# Patient Record
Sex: FEMALE | Race: BLACK OR AFRICAN AMERICAN | Employment: OTHER | URBAN - METROPOLITAN AREA
[De-identification: names, ages, dates, MRNs, and addresses within clinical notes are randomized per-mention and may not be internally consistent; named-entity substitution may affect disease eponyms.]

---

## 2017-08-30 ENCOUNTER — OFFICE VISIT (OUTPATIENT)
Dept: INTERNAL MEDICINE CLINIC | Age: 52
End: 2017-08-30

## 2017-08-30 VITALS
BODY MASS INDEX: 23.87 KG/M2 | SYSTOLIC BLOOD PRESSURE: 124 MMHG | DIASTOLIC BLOOD PRESSURE: 76 MMHG | HEART RATE: 60 BPM | WEIGHT: 138 LBS

## 2017-08-30 DIAGNOSIS — D50.9 MICROCYTIC ANEMIA: Chronic | ICD-10-CM

## 2017-08-30 DIAGNOSIS — Z12.11 COLON CANCER SCREENING: Primary | ICD-10-CM

## 2017-08-30 DIAGNOSIS — M47.817 LUMBAR AND SACRAL OSTEOARTHRITIS: ICD-10-CM

## 2017-08-30 DIAGNOSIS — M54.32 SCIATICA OF LEFT SIDE: ICD-10-CM

## 2017-08-30 PROCEDURE — 99213 OFFICE O/P EST LOW 20 MIN: CPT | Performed by: INTERNAL MEDICINE

## 2017-10-19 DIAGNOSIS — Z12.11 SCREEN FOR COLON CANCER: Primary | ICD-10-CM

## 2017-10-19 NOTE — LETTER
[facility] -[address] [place] [address] [phone]    Soto Johnson MD                        ENDOSCOPY ORDER   -- Time of order -- 10/19/17    11:07 AM    Facility:   [facility]. # _________________                              Scheduled by: ____________    Colonoscopy Date & Time: 17  10:00 AM            Pt arrival: 8:30 AM                                                                                      Patient Name:  Rowdy Grier     :  1965 PCP:  Lalo Galeano MD      Home Ph:    [phone] (home) [phone] (work)    SSN:                                                PROCEDURE:  Colonoscopy, possible polypectomy         [id number]    DIAGNOSIS:      ICD-10-CM ICD-9-CM    1. Screen for colon cancer Z12.11 V76.51 polyethylene glycol (GOLYTELY) 236 g solution       Anesthesia: _MAC_  Time Needed:  45 minutes   Pt Position:  lateral, right side up         Outpatient _X_         Pre-Op H & P to be done by:                            __x___PREPLyinez Jarvises (ordered 10/19/17)                           _____Cardiac Clearance by; ___________     Medications to be stopped 5 days before surgery: _________  Additional / Special Orders:                               COLONOSCOPY: Pre-procedure Information, Instructions, Prep, and Directions    PLEASE READ THIS ENTIRE HANDOUT. CALL THE OFFICE WITH ANY QUESTIONS: [phone] [facility]) or [phone] Jese Maxwell)     What is a Colonoscopy? A colonoscopy is a test (also called a procedure) that lets the doctor look inside your large intestine (colon). The doctor uses a thin, lighted tube called a colonoscope. There are many reasons to perform a colonoscopy, such as bleeding, abdominal pain, anemia, diarrhea, severe constipation, or for cancer screening and prevention. do not stop until you get the ok      The Day Before your Colonoscopy  · DO NOT EAT SOLID FOOD THE DAY BEFORE YOUR PROCEDURE.   · DRINK ONLY CLEAR LIQUIDS THE DAY BEFORE YOUR PROCEDURE:  · You can have water, tea, coffee (without cream), clear juices without pulp, soda, clear broths, hard candy, flavored ice pops, Ensure/Boost drinks, and gelatin (such as Jell-O). Do not drink anything red or purple, such as grape juice or fruit punch. · Drink at least 8oz of clear liquids every hour throughout the day to keep you hydrated. · In the morning- mix the Golytely solution (this was sent to your pharmacy) and place in the refrigerator. · At 4pm-Drink an 8oz glass of GoLytely every 10-20 min until ½ of the bottle is empty. If you become nauseated, take a rest and then be sure to finish half of the bottle. ? If you are diabetic please talk to your Doctor about your Insulin instructions. The Day of your Colonoscopy  · 6 hours before your procedure- Drink an 8oz glass of GoLytely every 10-20 min until the bottle is completely gone. · Stop drinking clear liquids 3 hours before the test.  · The morning of the procedure- You may take any heart or blood pressure medications with a small sip of water. · You must have someone WITH YOU to drive you home after the procedure. · PLEASE ARRIVE AT [place] [address] 1.5 HOURS BEFORE THE START OF THE PROCEDURE  · Please bring a list of medications, any inhalers, photo ID and insurance cards with you. If you have a pacemaker or defibrillator, please inform your nurse. · Please call [phone] with questions    For many people, the prep is worse than the procedure itself. It may be uncomfortable, and you may feel hungry on the clear liquid diet. Some people do not go to work or do not do their usual activities on the day of the prep. Arrange to have someone take you home after the procedure. What can you expect after a colonoscopy? recommended. Be sure to communicate with your primary care physician about the results of your colonoscopy.     YOU ARE SCHEDULED FOR COLONOSCOPY WITH DR Shanda Saint    Date: 11/13/17    Time:  10:00 AM    Please arrive at [facility] at: 8:30 AM

## 2017-11-02 RX ORDER — SODIUM CHLORIDE 9 MG/ML
INJECTION, SOLUTION INTRAVENOUS CONTINUOUS
Status: CANCELLED | OUTPATIENT
Start: 2017-11-02

## 2017-11-02 RX ORDER — LIDOCAINE HYDROCHLORIDE 10 MG/ML
1 INJECTION, SOLUTION EPIDURAL; INFILTRATION; INTRACAUDAL; PERINEURAL
Status: CANCELLED | OUTPATIENT
Start: 2017-11-02 | End: 2017-11-02

## 2017-11-10 ENCOUNTER — TELEPHONE (OUTPATIENT)
Dept: SURGERY | Age: 52
End: 2017-11-10

## 2017-11-13 ENCOUNTER — HOSPITAL ENCOUNTER (OUTPATIENT)
Dept: ENDOSCOPY | Age: 52
Discharge: HOME | End: 2017-11-07
Attending: SURGERY | Admitting: SURGERY

## 2017-11-30 ENCOUNTER — TELEPHONE (OUTPATIENT)
Dept: SURGERY | Age: 52
End: 2017-11-30

## 2017-12-29 ENCOUNTER — TELEPHONE (OUTPATIENT)
Dept: SURGERY | Age: 52
End: 2017-12-29

## 2018-01-04 ENCOUNTER — TELEPHONE (OUTPATIENT)
Dept: SURGERY | Age: 53
End: 2018-01-04

## 2018-04-09 ENCOUNTER — OFFICE VISIT (OUTPATIENT)
Dept: INTERNAL MEDICINE CLINIC | Age: 53
End: 2018-04-09

## 2018-04-09 ENCOUNTER — HOSPITAL ENCOUNTER (OUTPATIENT)
Dept: OTHER | Age: 53
Discharge: HOME | End: 2018-04-09
Attending: INTERNAL MEDICINE | Admitting: INTERNAL MEDICINE

## 2018-04-09 VITALS
SYSTOLIC BLOOD PRESSURE: 132 MMHG | HEART RATE: 60 BPM | BODY MASS INDEX: 24.1 KG/M2 | HEIGHT: 63 IN | DIASTOLIC BLOOD PRESSURE: 84 MMHG | WEIGHT: 136 LBS

## 2018-04-09 DIAGNOSIS — M75.52 DELTOID BURSITIS, LEFT: Primary | ICD-10-CM

## 2018-04-09 DIAGNOSIS — M75.81 TENDINITIS OF RIGHT ROTATOR CUFF: Primary | ICD-10-CM

## 2018-04-09 DIAGNOSIS — M75.32 CALCIFIC TENDINITIS OF LEFT SHOULDER REGION: ICD-10-CM

## 2018-04-09 DIAGNOSIS — M75.52 DELTOID BURSITIS, LEFT: ICD-10-CM

## 2018-04-09 PROCEDURE — 99213 OFFICE O/P EST LOW 20 MIN: CPT | Performed by: INTERNAL MEDICINE

## 2018-04-09 PROCEDURE — 96372 THER/PROPH/DIAG INJ SC/IM: CPT | Performed by: INTERNAL MEDICINE

## 2018-04-09 RX ORDER — KETOROLAC TROMETHAMINE 30 MG/ML
60 INJECTION, SOLUTION INTRAMUSCULAR; INTRAVENOUS
Qty: 2 ML | Refills: 0
Start: 2018-04-09 | End: 2018-10-24 | Stop reason: ALTCHOICE

## 2018-04-09 RX ORDER — KETOROLAC TROMETHAMINE 30 MG/ML
60 INJECTION, SOLUTION INTRAMUSCULAR; INTRAVENOUS ONCE
Status: COMPLETED | OUTPATIENT
Start: 2018-04-09 | End: 2018-04-09

## 2018-04-09 RX ORDER — TRAMADOL HYDROCHLORIDE 50 MG/1
50 TABLET ORAL EVERY 6 HOURS PRN
Qty: 28 TABLET | Refills: 0 | Status: SHIPPED | OUTPATIENT
Start: 2018-04-09 | End: 2018-04-16

## 2018-04-09 RX ORDER — DICLOFENAC POTASSIUM 50 MG/1
50 TABLET, FILM COATED ORAL 2 TIMES DAILY
Qty: 60 TABLET | Refills: 3 | Status: SHIPPED | OUTPATIENT
Start: 2018-04-09 | End: 2018-06-19 | Stop reason: SDUPTHER

## 2018-04-09 RX ADMIN — KETOROLAC TROMETHAMINE 60 MG: 30 INJECTION, SOLUTION INTRAMUSCULAR; INTRAVENOUS at 10:06

## 2018-04-11 ENCOUNTER — TELEPHONE (OUTPATIENT)
Dept: RHEUMATOLOGY | Age: 53
End: 2018-04-11

## 2018-04-16 ENCOUNTER — TELEPHONE (OUTPATIENT)
Dept: INTERNAL MEDICINE CLINIC | Age: 53
End: 2018-04-16

## 2018-04-16 DIAGNOSIS — M75.82 TENDINITIS OF LEFT ROTATOR CUFF: Primary | ICD-10-CM

## 2018-04-24 ENCOUNTER — HOSPITAL ENCOUNTER (OUTPATIENT)
Dept: PHYSICAL THERAPY | Age: 53
Discharge: HOME | End: 2018-04-30
Admitting: INTERNAL MEDICINE

## 2018-05-01 ENCOUNTER — HOSPITAL ENCOUNTER (OUTPATIENT)
Dept: OTHER | Age: 53
Discharge: HOME | End: 2018-05-31
Attending: INTERNAL MEDICINE | Admitting: INTERNAL MEDICINE

## 2018-05-08 ENCOUNTER — OFFICE VISIT (OUTPATIENT)
Dept: INTERNAL MEDICINE CLINIC | Age: 53
End: 2018-05-08

## 2018-05-08 VITALS
HEART RATE: 72 BPM | SYSTOLIC BLOOD PRESSURE: 122 MMHG | BODY MASS INDEX: 24.45 KG/M2 | DIASTOLIC BLOOD PRESSURE: 74 MMHG | HEIGHT: 63 IN | WEIGHT: 138 LBS

## 2018-05-08 DIAGNOSIS — M75.102 ROTATOR CUFF SYNDROME OF LEFT SHOULDER: Primary | ICD-10-CM

## 2018-05-08 PROCEDURE — 99213 OFFICE O/P EST LOW 20 MIN: CPT | Performed by: INTERNAL MEDICINE

## 2018-05-08 RX ORDER — TRAMADOL HYDROCHLORIDE 50 MG/1
50 TABLET ORAL EVERY 4 HOURS PRN
Qty: 42 TABLET | Refills: 0 | Status: SHIPPED | OUTPATIENT
Start: 2018-05-08 | End: 2018-05-15

## 2018-05-08 ASSESSMENT — PATIENT HEALTH QUESTIONNAIRE - PHQ9
2. FEELING DOWN, DEPRESSED OR HOPELESS: 0
SUM OF ALL RESPONSES TO PHQ9 QUESTIONS 1 & 2: 0
SUM OF ALL RESPONSES TO PHQ QUESTIONS 1-9: 0
1. LITTLE INTEREST OR PLEASURE IN DOING THINGS: 0

## 2018-05-24 ENCOUNTER — TELEPHONE (OUTPATIENT)
Dept: INTERNAL MEDICINE CLINIC | Age: 53
End: 2018-05-24

## 2018-05-29 ENCOUNTER — TELEPHONE (OUTPATIENT)
Dept: INTERNAL MEDICINE CLINIC | Age: 53
End: 2018-05-29

## 2018-05-30 ENCOUNTER — TELEPHONE (OUTPATIENT)
Dept: INTERNAL MEDICINE CLINIC | Age: 53
End: 2018-05-30

## 2018-06-01 ENCOUNTER — HOSPITAL ENCOUNTER (OUTPATIENT)
Dept: OTHER | Age: 53
Discharge: HOME | End: 2018-06-30
Attending: INTERNAL MEDICINE | Admitting: INTERNAL MEDICINE

## 2018-06-19 ENCOUNTER — OFFICE VISIT (OUTPATIENT)
Dept: INTERNAL MEDICINE CLINIC | Age: 53
End: 2018-06-19

## 2018-06-19 VITALS
WEIGHT: 136 LBS | DIASTOLIC BLOOD PRESSURE: 70 MMHG | BODY MASS INDEX: 24.1 KG/M2 | HEART RATE: 64 BPM | SYSTOLIC BLOOD PRESSURE: 110 MMHG | HEIGHT: 63 IN

## 2018-06-19 DIAGNOSIS — M75.32 CALCIFIC TENDINITIS OF LEFT SHOULDER REGION: ICD-10-CM

## 2018-06-19 DIAGNOSIS — M75.82 TENDINITIS OF LEFT ROTATOR CUFF: Primary | ICD-10-CM

## 2018-06-19 DIAGNOSIS — M75.52 DELTOID BURSITIS, LEFT: ICD-10-CM

## 2018-06-19 PROCEDURE — 99213 OFFICE O/P EST LOW 20 MIN: CPT | Performed by: INTERNAL MEDICINE

## 2018-06-19 RX ORDER — DICLOFENAC POTASSIUM 50 MG/1
50 TABLET, FILM COATED ORAL 2 TIMES DAILY
Qty: 60 TABLET | Refills: 5 | Status: SHIPPED | OUTPATIENT
Start: 2018-06-19 | End: 2021-08-04 | Stop reason: SDUPTHER

## 2018-07-01 ENCOUNTER — HOSPITAL ENCOUNTER (OUTPATIENT)
Dept: OTHER | Age: 53
Discharge: HOME | End: 2018-07-31
Attending: INTERNAL MEDICINE | Admitting: INTERNAL MEDICINE

## 2018-09-17 ENCOUNTER — TELEPHONE (OUTPATIENT)
Dept: INTERNAL MEDICINE CLINIC | Age: 53
End: 2018-09-17

## 2018-10-24 ENCOUNTER — OFFICE VISIT (OUTPATIENT)
Dept: INTERNAL MEDICINE CLINIC | Age: 53
End: 2018-10-24
Payer: COMMERCIAL

## 2018-10-24 VITALS
HEART RATE: 64 BPM | WEIGHT: 134 LBS | DIASTOLIC BLOOD PRESSURE: 72 MMHG | BODY MASS INDEX: 23.74 KG/M2 | SYSTOLIC BLOOD PRESSURE: 110 MMHG

## 2018-10-24 DIAGNOSIS — Z12.11 COLON CANCER SCREENING: Primary | ICD-10-CM

## 2018-10-24 DIAGNOSIS — Z11.4 SCREENING FOR HIV (HUMAN IMMUNODEFICIENCY VIRUS): ICD-10-CM

## 2018-10-24 DIAGNOSIS — M75.102 ROTATOR CUFF SYNDROME OF LEFT SHOULDER: Chronic | ICD-10-CM

## 2018-10-24 DIAGNOSIS — Z00.00 ROUTINE ADULT HEALTH MAINTENANCE: ICD-10-CM

## 2018-10-24 PROCEDURE — 99213 OFFICE O/P EST LOW 20 MIN: CPT | Performed by: INTERNAL MEDICINE

## 2018-10-25 DIAGNOSIS — Z11.4 SCREENING FOR HIV (HUMAN IMMUNODEFICIENCY VIRUS): ICD-10-CM

## 2018-10-25 DIAGNOSIS — Z00.00 ROUTINE ADULT HEALTH MAINTENANCE: ICD-10-CM

## 2018-10-25 LAB
A/G RATIO: 1.7 (ref 1.1–2.2)
ALBUMIN SERPL-MCNC: 4.6 G/DL (ref 3.4–5)
ALP BLD-CCNC: 52 U/L (ref 40–129)
ALT SERPL-CCNC: 7 U/L (ref 10–40)
ANION GAP SERPL CALCULATED.3IONS-SCNC: 13 MMOL/L (ref 3–16)
AST SERPL-CCNC: 12 U/L (ref 15–37)
BASOPHILS ABSOLUTE: 0 K/UL (ref 0–0.2)
BASOPHILS RELATIVE PERCENT: 0.5 %
BILIRUB SERPL-MCNC: 0.3 MG/DL (ref 0–1)
BUN BLDV-MCNC: 12 MG/DL (ref 7–20)
CALCIUM SERPL-MCNC: 10 MG/DL (ref 8.3–10.6)
CHLORIDE BLD-SCNC: 101 MMOL/L (ref 99–110)
CHOLESTEROL, TOTAL: 240 MG/DL (ref 0–199)
CO2: 26 MMOL/L (ref 21–32)
CREAT SERPL-MCNC: 0.7 MG/DL (ref 0.6–1.1)
EOSINOPHILS ABSOLUTE: 0.3 K/UL (ref 0–0.6)
EOSINOPHILS RELATIVE PERCENT: 3.8 %
GFR AFRICAN AMERICAN: >60
GFR NON-AFRICAN AMERICAN: >60
GLOBULIN: 2.7 G/DL
GLUCOSE BLD-MCNC: 86 MG/DL (ref 70–99)
HCT VFR BLD CALC: 39.4 % (ref 36–48)
HDLC SERPL-MCNC: 66 MG/DL (ref 40–60)
HEMOGLOBIN: 13 G/DL (ref 12–16)
LDL CHOLESTEROL CALCULATED: 163 MG/DL
LYMPHOCYTES ABSOLUTE: 2.1 K/UL (ref 1–5.1)
LYMPHOCYTES RELATIVE PERCENT: 25.1 %
MCH RBC QN AUTO: 23.7 PG (ref 26–34)
MCHC RBC AUTO-ENTMCNC: 33.1 G/DL (ref 31–36)
MCV RBC AUTO: 71.6 FL (ref 80–100)
MONOCYTES ABSOLUTE: 0.5 K/UL (ref 0–1.3)
MONOCYTES RELATIVE PERCENT: 5.9 %
NEUTROPHILS ABSOLUTE: 5.4 K/UL (ref 1.7–7.7)
NEUTROPHILS RELATIVE PERCENT: 64.7 %
PDW BLD-RTO: 14.7 % (ref 12.4–15.4)
PLATELET # BLD: 237 K/UL (ref 135–450)
PMV BLD AUTO: 9.9 FL (ref 5–10.5)
POTASSIUM SERPL-SCNC: 4.4 MMOL/L (ref 3.5–5.1)
RBC # BLD: 5.5 M/UL (ref 4–5.2)
SODIUM BLD-SCNC: 140 MMOL/L (ref 136–145)
T4 FREE: 1.1 NG/DL (ref 0.9–1.8)
TOTAL PROTEIN: 7.3 G/DL (ref 6.4–8.2)
TRIGL SERPL-MCNC: 55 MG/DL (ref 0–150)
TSH SERPL DL<=0.05 MIU/L-ACNC: 1.19 UIU/ML (ref 0.27–4.2)
VLDLC SERPL CALC-MCNC: 11 MG/DL
WBC # BLD: 8.3 K/UL (ref 4–11)

## 2018-10-26 LAB
HIV AG/AB: NORMAL
HIV ANTIGEN: NORMAL
HIV-1 ANTIBODY: NORMAL
HIV-2 AB: NORMAL

## 2019-04-11 ENCOUNTER — TELEPHONE (OUTPATIENT)
Dept: INTERNAL MEDICINE CLINIC | Age: 54
End: 2019-04-11

## 2019-04-11 DIAGNOSIS — Z82.49 FAMILY HISTORY OF THROMBOSIS: Primary | ICD-10-CM

## 2019-04-16 ENCOUNTER — HOSPITAL ENCOUNTER (OUTPATIENT)
Age: 54
Discharge: HOME | End: 2019-04-16
Payer: COMMERCIAL

## 2019-04-16 DIAGNOSIS — Z82.49 FAMILY HISTORY OF THROMBOSIS: ICD-10-CM

## 2019-04-16 PROCEDURE — 85300 ANTITHROMBIN III ACTIVITY: CPT

## 2019-04-16 PROCEDURE — 85301 ANTITHROMBIN III ANTIGEN: CPT

## 2019-04-16 PROCEDURE — 81241 F5 GENE: CPT

## 2019-04-19 LAB
ANTITHROMBIN ACTIVITY: 99 % (ref 76–128)
ANTITHROMBIN ANTIGEN: 101 % (ref 82–136)

## 2019-04-20 LAB
FACTOR V LEIDEN: ABNORMAL
SPECIMEN: ABNORMAL

## 2019-04-26 ENCOUNTER — OFFICE VISIT (OUTPATIENT)
Dept: INTERNAL MEDICINE CLINIC | Age: 54
End: 2019-04-26
Payer: COMMERCIAL

## 2019-04-26 VITALS
BODY MASS INDEX: 24.45 KG/M2 | WEIGHT: 138 LBS | HEART RATE: 60 BPM | HEIGHT: 63 IN | SYSTOLIC BLOOD PRESSURE: 120 MMHG | DIASTOLIC BLOOD PRESSURE: 72 MMHG

## 2019-04-26 DIAGNOSIS — Z83.2 FAMILY HISTORY OF HYPERCOAGULABLE STATE: ICD-10-CM

## 2019-04-26 DIAGNOSIS — M75.102 ROTATOR CUFF SYNDROME OF LEFT SHOULDER: Primary | Chronic | ICD-10-CM

## 2019-04-26 PROCEDURE — 99213 OFFICE O/P EST LOW 20 MIN: CPT | Performed by: INTERNAL MEDICINE

## 2019-04-26 ASSESSMENT — PATIENT HEALTH QUESTIONNAIRE - PHQ9
1. LITTLE INTEREST OR PLEASURE IN DOING THINGS: 0
SUM OF ALL RESPONSES TO PHQ QUESTIONS 1-9: 0
2. FEELING DOWN, DEPRESSED OR HOPELESS: 0
SUM OF ALL RESPONSES TO PHQ QUESTIONS 1-9: 0
SUM OF ALL RESPONSES TO PHQ9 QUESTIONS 1 & 2: 0

## 2019-04-26 NOTE — PROGRESS NOTES
SUBJECTIVE:  Patient ID: Geni Luna is a 48 y.o. y.o. female     HPI    Geni Luna returns to the office for follow up of osteoarthritis. Pain is located in the left shoulder. She has been taking medication for treatment of symptoms for month(s). Medications for treatment of pain include NSAID's and surgery. Patient reports good relief ofsymptoms. Pain is not associated with other symptoms. Factor 5 Leiden and anti-thrombin III tests were done to determine whether or not the patient had a hypercoagulable state. The 10-year ASCVD risk score (Susan Gallegos, et al., 2013) is: 1.6%    Values used to calculate the score:      Age: 48 years      Sex: Female      Is Non- : Yes      Diabetic: No      Tobacco smoker: No      Systolic Blood Pressure: 571 mmHg      Is BP treated: No      HDL Cholesterol: 66 mg/dL      Total Cholesterol: 240 mg/dL    Review of Systems    OBJECTIVE:    /72   Pulse 60   Ht 5' 3\" (1.6 m)   Wt 138 lb (62.6 kg)   BMI 24.45 kg/m²    Physical Exam   Constitutional: She is oriented to person, place, and time. She appears well-developed and well-nourished. No distress. HENT:   Head: Normocephalic and atraumatic. Pulmonary/Chest: Effort normal.   Neurological: She is alert and oriented to person, place, and time. No cranial nerve deficit. Skin: She is not diaphoretic. Psychiatric: She has a normal mood and affect. Her behavior is normal. Judgment and thought content normal.   Vitals reviewed. Current Outpatient Medications:     Multiple Vitamins-Minerals (THERAPEUTIC MULTIVITAMIN-MINERALS) tablet, Take 1 tablet by mouth daily, Disp: , Rfl:     Omega-3 Fatty Acids (FISH OIL PO), Take by mouth, Disp: , Rfl:     fluticasone (FLONASE) 50 MCG/ACT nasal spray, 1 spray by Nasal route daily. , Disp: 3 Bottle, Rfl: 3    ibuprofen (IBU) 600 MG tablet, Take 1 tablet by mouth every 6 hours as needed for Pain., Disp: 20 tablet, Rfl: 0  

## 2019-08-06 ENCOUNTER — TELEPHONE (OUTPATIENT)
Dept: INTERNAL MEDICINE CLINIC | Age: 54
End: 2019-08-06

## 2019-08-06 DIAGNOSIS — D22.9 CHANGE IN MOLE: Primary | ICD-10-CM

## 2019-08-07 NOTE — TELEPHONE ENCOUNTER
Patient called back and stated that Dr. Manjula Bah is not accepting new patients. Any other recommendations?

## 2019-10-16 ENCOUNTER — OFFICE VISIT (OUTPATIENT)
Dept: DERMATOLOGY | Age: 54
End: 2019-10-16
Payer: COMMERCIAL

## 2019-10-16 DIAGNOSIS — L82.1 SEBORRHEIC KERATOSIS: Primary | ICD-10-CM

## 2019-10-16 PROCEDURE — 99202 OFFICE O/P NEW SF 15 MIN: CPT | Performed by: DERMATOLOGY

## 2021-05-17 DIAGNOSIS — U07.1 COVID-19: Primary | ICD-10-CM

## 2021-05-18 ENCOUNTER — TELEPHONE (OUTPATIENT)
Dept: INTERNAL MEDICINE CLINIC | Age: 56
End: 2021-05-18

## 2021-05-18 NOTE — TELEPHONE ENCOUNTER
Huong Avelinokirby called in to advise that she has been diagnosed with Covid and would like inquire about getting a prescription called in to help with nausea. She would prefer the [facility] pharmacy located at [address] [place]  [facility]. Please reach out to her @ [phone]. Juanito Dean(Attending)

## 2021-05-19 ENCOUNTER — TELEPHONE (OUTPATIENT)
Dept: INTERNAL MEDICINE CLINIC | Age: 56
End: 2021-05-19

## 2021-05-19 NOTE — TELEPHONE ENCOUNTER
Joe Hawkins from [facility] called to verify Elena's  and PCP. It's in regards to 20 tablets of Zofran 4mg. The /PCP was missing. Please contact Joe Hawkins @ [phone] to verify that the prescription is correct. Thank you!

## 2021-05-21 DIAGNOSIS — R11.0 NAUSEA: Primary | ICD-10-CM

## 2021-05-21 RX ORDER — ONDANSETRON 4 MG/1
4 TABLET, ORALLY DISINTEGRATING ORAL 3 TIMES DAILY PRN
Qty: 21 TABLET | Refills: 0 | Status: SHIPPED | OUTPATIENT
Start: 2021-05-21

## 2021-08-03 ENCOUNTER — TELEPHONE (OUTPATIENT)
Dept: INTERNAL MEDICINE CLINIC | Age: 56
End: 2021-08-03

## 2021-08-03 DIAGNOSIS — U07.1 COVID-19: Primary | ICD-10-CM

## 2021-08-03 NOTE — TELEPHONE ENCOUNTER
I called pt back and she states she is debating on getting the vaccine now. She wants to know where she is as far as antibodies go since she has had covid in the past.  Please advise. Thanks.

## 2021-08-03 NOTE — TELEPHONE ENCOUNTER
----- Message from [facility] sent at 8/2/2021  3:49 PM EDT -----  Subject: Message to Provider    QUESTIONS  Information for Provider? Pt would like to know does the office do   tighters for COVID. pt would like to be tested if so.   ---------------------------------------------------------------------------  --------------  CALL BACK INFO  What is the best way for the office to contact you? OK to leave message on   voicemail  Preferred Call Back Phone Number? [phone]  ---------------------------------------------------------------------------  --------------  SCRIPT ANSWERS  Relationship to Patient?  Self

## 2021-08-04 ENCOUNTER — OFFICE VISIT (OUTPATIENT)
Dept: INTERNAL MEDICINE CLINIC | Age: 56
End: 2021-08-04
Payer: COMMERCIAL

## 2021-08-04 VITALS
TEMPERATURE: 97.1 F | RESPIRATION RATE: 16 BRPM | OXYGEN SATURATION: 98 % | DIASTOLIC BLOOD PRESSURE: 70 MMHG | SYSTOLIC BLOOD PRESSURE: 114 MMHG | HEART RATE: 64 BPM | WEIGHT: 128.6 LBS | BODY MASS INDEX: 22.78 KG/M2

## 2021-08-04 DIAGNOSIS — Z00.00 ROUTINE ADULT HEALTH MAINTENANCE: Primary | ICD-10-CM

## 2021-08-04 DIAGNOSIS — Z00.00 ROUTINE ADULT HEALTH MAINTENANCE: ICD-10-CM

## 2021-08-04 DIAGNOSIS — M75.52 DELTOID BURSITIS, LEFT: ICD-10-CM

## 2021-08-04 DIAGNOSIS — Z11.59 NEED FOR HEPATITIS C SCREENING TEST: ICD-10-CM

## 2021-08-04 DIAGNOSIS — M75.82 TENDINITIS OF LEFT ROTATOR CUFF: ICD-10-CM

## 2021-08-04 DIAGNOSIS — M75.32 CALCIFIC TENDINITIS OF LEFT SHOULDER REGION: ICD-10-CM

## 2021-08-04 DIAGNOSIS — Z12.11 COLON CANCER SCREENING: ICD-10-CM

## 2021-08-04 DIAGNOSIS — M17.11 PRIMARY OSTEOARTHRITIS OF RIGHT KNEE: ICD-10-CM

## 2021-08-04 LAB
A/G RATIO: 1.4 (ref 1.1–2.2)
ALBUMIN SERPL-MCNC: 4.3 G/DL (ref 3.4–5)
ALP BLD-CCNC: 58 U/L (ref 40–129)
ALT SERPL-CCNC: <5 U/L (ref 10–40)
ANION GAP SERPL CALCULATED.3IONS-SCNC: 14 MMOL/L (ref 3–16)
AST SERPL-CCNC: 17 U/L (ref 15–37)
BASOPHILS ABSOLUTE: 0.1 K/UL (ref 0–0.2)
BASOPHILS RELATIVE PERCENT: 0.9 %
BILIRUB SERPL-MCNC: 0.4 MG/DL (ref 0–1)
BUN BLDV-MCNC: 12 MG/DL (ref 7–20)
CALCIUM SERPL-MCNC: 9.4 MG/DL (ref 8.3–10.6)
CHLORIDE BLD-SCNC: 99 MMOL/L (ref 99–110)
CHOLESTEROL, TOTAL: 272 MG/DL (ref 0–199)
CO2: 24 MMOL/L (ref 21–32)
CREAT SERPL-MCNC: 0.7 MG/DL (ref 0.6–1.1)
EOSINOPHILS ABSOLUTE: 0.4 K/UL (ref 0–0.6)
EOSINOPHILS RELATIVE PERCENT: 4.9 %
GFR AFRICAN AMERICAN: >60
GFR NON-AFRICAN AMERICAN: >60
GLOBULIN: 3 G/DL
GLUCOSE BLD-MCNC: 81 MG/DL (ref 70–99)
HCT VFR BLD CALC: 36 % (ref 36–48)
HDLC SERPL-MCNC: 70 MG/DL (ref 40–60)
HEMOGLOBIN: 12.3 G/DL (ref 12–16)
HEPATITIS C ANTIBODY INTERPRETATION: NORMAL
LDL CHOLESTEROL CALCULATED: 194 MG/DL
LYMPHOCYTES ABSOLUTE: 2 K/UL (ref 1–5.1)
LYMPHOCYTES RELATIVE PERCENT: 27.4 %
MCH RBC QN AUTO: 24.3 PG (ref 26–34)
MCHC RBC AUTO-ENTMCNC: 34.3 G/DL (ref 31–36)
MCV RBC AUTO: 70.9 FL (ref 80–100)
MONOCYTES ABSOLUTE: 0.5 K/UL (ref 0–1.3)
MONOCYTES RELATIVE PERCENT: 7.3 %
NEUTROPHILS ABSOLUTE: 4.4 K/UL (ref 1.7–7.7)
NEUTROPHILS RELATIVE PERCENT: 59.5 %
PDW BLD-RTO: 14.9 % (ref 12.4–15.4)
PLATELET # BLD: 256 K/UL (ref 135–450)
PMV BLD AUTO: 9.4 FL (ref 5–10.5)
POTASSIUM SERPL-SCNC: 4.3 MMOL/L (ref 3.5–5.1)
RBC # BLD: 5.07 M/UL (ref 4–5.2)
SODIUM BLD-SCNC: 137 MMOL/L (ref 136–145)
T4 FREE: 1.1 NG/DL (ref 0.9–1.8)
TOTAL PROTEIN: 7.3 G/DL (ref 6.4–8.2)
TRIGL SERPL-MCNC: 39 MG/DL (ref 0–150)
TSH SERPL DL<=0.05 MIU/L-ACNC: 1.12 UIU/ML (ref 0.27–4.2)
VLDLC SERPL CALC-MCNC: 8 MG/DL
WBC # BLD: 7.4 K/UL (ref 4–11)

## 2021-08-04 PROCEDURE — 99396 PREV VISIT EST AGE 40-64: CPT | Performed by: INTERNAL MEDICINE

## 2021-08-04 RX ORDER — DICLOFENAC POTASSIUM 50 MG/1
50 TABLET, FILM COATED ORAL 2 TIMES DAILY
Qty: 60 TABLET | Refills: 5 | Status: SHIPPED | OUTPATIENT
Start: 2021-08-04

## 2021-08-04 SDOH — ECONOMIC STABILITY: FOOD INSECURITY: WITHIN THE PAST 12 MONTHS, YOU WORRIED THAT YOUR FOOD WOULD RUN OUT BEFORE YOU GOT MONEY TO BUY MORE.: NEVER TRUE

## 2021-08-04 SDOH — ECONOMIC STABILITY: FOOD INSECURITY: WITHIN THE PAST 12 MONTHS, THE FOOD YOU BOUGHT JUST DIDN'T LAST AND YOU DIDN'T HAVE MONEY TO GET MORE.: NEVER TRUE

## 2021-08-04 ASSESSMENT — PATIENT HEALTH QUESTIONNAIRE - PHQ9
SUM OF ALL RESPONSES TO PHQ9 QUESTIONS 1 & 2: 0
SUM OF ALL RESPONSES TO PHQ QUESTIONS 1-9: 0
2. FEELING DOWN, DEPRESSED OR HOPELESS: 0
1. LITTLE INTEREST OR PLEASURE IN DOING THINGS: 0
SUM OF ALL RESPONSES TO PHQ QUESTIONS 1-9: 0
SUM OF ALL RESPONSES TO PHQ QUESTIONS 1-9: 0

## 2021-08-04 ASSESSMENT — SOCIAL DETERMINANTS OF HEALTH (SDOH): HOW HARD IS IT FOR YOU TO PAY FOR THE VERY BASICS LIKE FOOD, HOUSING, MEDICAL CARE, AND HEATING?: NOT HARD AT ALL

## 2021-08-04 NOTE — PROGRESS NOTES
Narrative    Not on file     Social Determinants of Health     Financial Resource Strain: Low Risk     Difficulty of Paying Living Expenses: Not hard at all   Food Insecurity: No Food Insecurity    Worried About Running Out of Food in the Last Year: Never true    [address] in the Last Year: Never true   Transportation Needs:     Lack of Transportation (Medical):  Lack of Transportation (Non-Medical):    Physical Activity:     Days of Exercise per Week:     Minutes of Exercise per Session:    Stress:     Feeling of Stress :    Social Connections:     Frequency of Communication with Friends and Family:     Frequency of Social Gatherings with Friends and Family:     Attends [employer] Services:     Active Member of Clubs or Organizations:     Attends Club or Organization Meetings:     Marital Status:    Intimate Partner Violence:     Fear of Current or Ex-Partner:     Emotionally Abused:     Physically Abused:     Sexually Abused:        Current Outpatient Medications:     Multiple Vitamins-Minerals (THERAPEUTIC MULTIVITAMIN-MINERALS) tablet, Take 1 tablet by mouth daily, Disp: , Rfl:     fluticasone (FLONASE) 50 MCG/ACT nasal spray, 1 spray by Nasal route daily. , Disp: 3 Bottle, Rfl: 3    ibuprofen (IBU) 600 MG tablet, Take 1 tablet by mouth every 6 hours as needed for Pain., Disp: 20 tablet, Rfl: 0    ondansetron (ZOFRAN-ODT) 4 MG disintegrating tablet, Take 1 tablet by mouth 3 times daily as needed for Nausea or Vomiting, Disp: 21 tablet, Rfl: 0    diclofenac (CATAFLAM) 50 MG tablet, Take 1 tablet by mouth 2 times daily (Patient not taking: Reported on 10/16/2019), Disp: 60 tablet, Rfl: 5    Omega-3 Fatty Acids (FISH OIL PO), Take by mouth, Disp: , Rfl:   Allergies   Allergen Reactions    Sulfa Antibiotics Other (See Comments)     Burning from sulfa cream     Review of Systems   Musculoskeletal: Positive for arthralgias (Bilateral knees, thumb).      Vitals:    08/04/21 1407   BP: 114/70   Pulse: 64   Resp: 16   Temp: 97.1 °F (36.2 °C)   SpO2: 98%     Physical Exam  Vitals reviewed. Constitutional:       General: She is not in acute distress. Appearance: Normal appearance. She is well-developed. She is not diaphoretic. HENT:      Head: Normocephalic and atraumatic. Right Ear: External ear normal.      Left Ear: External ear normal.      Nose: Nose normal. No nasal deformity, congestion or rhinorrhea. Mouth/Throat:      Mouth: Mucous membranes are moist. No lacerations or oral lesions. Dentition: Does not have dentures. Pharynx: Oropharynx is clear. No oropharyngeal exudate or uvula swelling. Eyes:      General: No scleral icterus. Left eye: No discharge. Extraocular Movements: Extraocular movements intact. Conjunctiva/sclera: Conjunctivae normal.      Pupils: Pupils are equal, round, and reactive to light. Neck:      Thyroid: No thyroid mass or thyromegaly. Vascular: Normal carotid pulses. No carotid bruit, hepatojugular reflux or JVD. Trachea: Trachea normal. No tracheal tenderness or tracheal deviation. Cardiovascular:      Rate and Rhythm: Normal rate and regular rhythm. Chest Wall: PMI is not displaced. Pulses: Normal pulses. Heart sounds: Normal heart sounds, S1 normal and S2 normal. Heart sounds not distant. No murmur heard. No systolic murmur is present. No diastolic murmur is present. No friction rub. No gallop. No S3 or S4 sounds. Pulmonary:      Effort: Pulmonary effort is normal. No respiratory distress. Breath sounds: Normal breath sounds. No stridor. No wheezing, rhonchi or rales. Chest:      Chest wall: No tenderness. Abdominal:      General: Bowel sounds are normal. There is no distension or abdominal bruit. Palpations: Abdomen is soft. There is no shifting dullness or mass. Tenderness: There is no abdominal tenderness.  There is no right CVA tenderness, left CVA tenderness, guarding or rebound. Hernia: No hernia is present. Musculoskeletal:         General: Tenderness present. No swelling, deformity or signs of injury. Normal range of motion. Cervical back: Full passive range of motion without pain, normal range of motion and neck supple. No edema or rigidity. Right lower leg: No edema. Left lower leg: No edema. Lymphadenopathy:      Cervical: No cervical adenopathy. Skin:     General: Skin is warm and dry. Coloration: Skin is not pale. Findings: No rash. Neurological:      Mental Status: She is alert and oriented to person, place, and time. Cranial Nerves: No cranial nerve deficit. Psychiatric:         Mood and Affect: Mood is not anxious. Speech: Speech normal.         Behavior: Behavior normal. Behavior is not agitated. Thought Content: Thought content normal.         Judgment: Judgment normal.       Assessment/Plan:  Sajan Felix was seen today for annual exam.    Diagnoses and all orders for this visit:    Routine adult health maintenance  -     CBC Auto Differential; Future  -     Lipid Panel; Future  -     T4, Free; Future  -     TSH without Reflex; Future  -     Comprehensive Metabolic Panel; Future    Colon cancer screening  -     COLONOSCOPY (Screening); Future  -     Twin Kelley MD, Gastroenterology, [facility]    Primary osteoarthritis of right knee    Deltoid bursitis, left  -     diclofenac (CATAFLAM) 50 MG tablet; Take 1 tablet by mouth 2 times daily    Calcific tendinitis of left shoulder region  -     diclofenac (CATAFLAM) 50 MG tablet; Take 1 tablet by mouth 2 times daily    Tendinitis of left rotator cuff  Comments:  Home physical therapy  Orders:  -     diclofenac (CATAFLAM) 50 MG tablet; Take 1 tablet by mouth 2 times daily    Need for hepatitis C screening test  -     HEPATITIS C ANTIBODY;  Future          Dionna Ellis MD  FAC

## 2022-09-09 ENCOUNTER — TELEPHONE (OUTPATIENT)
Dept: INTERNAL MEDICINE CLINIC | Age: 57
End: 2022-09-09

## 2022-09-09 NOTE — TELEPHONE ENCOUNTER
----- Message from Maggie Tsang sent at 9/8/2022  4:26 PM EDT -----  Subject: Appointment Request    Reason for Call: Established Patient Appointment needed: Routine Physical   Exam    QUESTIONS    Reason for appointment request? No appointments available during search     Additional Information for Provider?  Patient is needing an appointment for   a physical. Please call to schedule   ---------------------------------------------------------------------------  --------------  [address] [employer]  [phone]; OK to leave message on voicemail  ---------------------------------------------------------------------------  --------------  SCRIPT ANSWERS  COVID Screen: Cy Garsia

## 2022-12-14 ENCOUNTER — OFFICE VISIT (OUTPATIENT)
Dept: INTERNAL MEDICINE CLINIC | Age: 57
End: 2022-12-14

## 2022-12-14 VITALS
DIASTOLIC BLOOD PRESSURE: 78 MMHG | BODY MASS INDEX: 22.68 KG/M2 | HEART RATE: 67 BPM | OXYGEN SATURATION: 98 % | WEIGHT: 128 LBS | HEIGHT: 63 IN | SYSTOLIC BLOOD PRESSURE: 136 MMHG | TEMPERATURE: 97.1 F

## 2022-12-14 DIAGNOSIS — Z00.00 ROUTINE ADULT HEALTH MAINTENANCE: ICD-10-CM

## 2022-12-14 DIAGNOSIS — Z00.00 ROUTINE ADULT HEALTH MAINTENANCE: Primary | ICD-10-CM

## 2022-12-14 DIAGNOSIS — J30.1 SEASONAL ALLERGIC RHINITIS DUE TO POLLEN: ICD-10-CM

## 2022-12-14 DIAGNOSIS — R00.2 HEART PALPITATIONS: ICD-10-CM

## 2022-12-14 DIAGNOSIS — I25.10 CORONARY ARTERY CALCIFICATION: ICD-10-CM

## 2022-12-14 DIAGNOSIS — I25.84 CORONARY ARTERY CALCIFICATION: ICD-10-CM

## 2022-12-14 LAB
A/G RATIO: 1.7 (ref 1.1–2.2)
ALBUMIN SERPL-MCNC: 4.5 G/DL (ref 3.4–5)
ALP BLD-CCNC: 70 U/L (ref 40–129)
ALT SERPL-CCNC: 7 U/L (ref 10–40)
ANION GAP SERPL CALCULATED.3IONS-SCNC: 13 MMOL/L (ref 3–16)
AST SERPL-CCNC: 13 U/L (ref 15–37)
BASOPHILS ABSOLUTE: 0.1 K/UL (ref 0–0.2)
BASOPHILS RELATIVE PERCENT: 0.8 %
BILIRUB SERPL-MCNC: 0.3 MG/DL (ref 0–1)
BUN BLDV-MCNC: 10 MG/DL (ref 7–20)
CALCIUM SERPL-MCNC: 9.9 MG/DL (ref 8.3–10.6)
CHLORIDE BLD-SCNC: 103 MMOL/L (ref 99–110)
CHOLESTEROL, TOTAL: 301 MG/DL (ref 0–199)
CO2: 26 MMOL/L (ref 21–32)
CREAT SERPL-MCNC: 0.7 MG/DL (ref 0.6–1.1)
EOSINOPHILS ABSOLUTE: 0.2 K/UL (ref 0–0.6)
EOSINOPHILS RELATIVE PERCENT: 3.1 %
GFR SERPL CREATININE-BSD FRML MDRD: >60 ML/MIN/{1.73_M2}
GLUCOSE BLD-MCNC: 82 MG/DL (ref 70–99)
HCT VFR BLD CALC: 39.2 % (ref 36–48)
HDLC SERPL-MCNC: 70 MG/DL (ref 40–60)
HEMOGLOBIN: 12.8 G/DL (ref 12–16)
LDL CHOLESTEROL CALCULATED: 220 MG/DL
LYMPHOCYTES ABSOLUTE: 1.7 K/UL (ref 1–5.1)
LYMPHOCYTES RELATIVE PERCENT: 24 %
MCH RBC QN AUTO: 23.9 PG (ref 26–34)
MCHC RBC AUTO-ENTMCNC: 32.7 G/DL (ref 31–36)
MCV RBC AUTO: 73.1 FL (ref 80–100)
MONOCYTES ABSOLUTE: 0.5 K/UL (ref 0–1.3)
MONOCYTES RELATIVE PERCENT: 6.7 %
NEUTROPHILS ABSOLUTE: 4.7 K/UL (ref 1.7–7.7)
NEUTROPHILS RELATIVE PERCENT: 65.4 %
PDW BLD-RTO: 14.6 % (ref 12.4–15.4)
PLATELET # BLD: 253 K/UL (ref 135–450)
PMV BLD AUTO: 10 FL (ref 5–10.5)
POTASSIUM SERPL-SCNC: 4.5 MMOL/L (ref 3.5–5.1)
RBC # BLD: 5.36 M/UL (ref 4–5.2)
SODIUM BLD-SCNC: 142 MMOL/L (ref 136–145)
T4 FREE: 1.2 NG/DL (ref 0.9–1.8)
TOTAL PROTEIN: 7.2 G/DL (ref 6.4–8.2)
TRIGL SERPL-MCNC: 54 MG/DL (ref 0–150)
TSH SERPL DL<=0.05 MIU/L-ACNC: 1.04 UIU/ML (ref 0.27–4.2)
VLDLC SERPL CALC-MCNC: 11 MG/DL
WBC # BLD: 7.2 K/UL (ref 4–11)

## 2022-12-14 RX ORDER — FLUTICASONE PROPIONATE 50 MCG
1 SPRAY, SUSPENSION (ML) NASAL DAILY
Qty: 3 EACH | Refills: 1 | Status: SHIPPED | OUTPATIENT
Start: 2022-12-14

## 2022-12-14 SDOH — ECONOMIC STABILITY: FOOD INSECURITY: WITHIN THE PAST 12 MONTHS, YOU WORRIED THAT YOUR FOOD WOULD RUN OUT BEFORE YOU GOT MONEY TO BUY MORE.: NEVER TRUE

## 2022-12-14 SDOH — ECONOMIC STABILITY: FOOD INSECURITY: WITHIN THE PAST 12 MONTHS, THE FOOD YOU BOUGHT JUST DIDN'T LAST AND YOU DIDN'T HAVE MONEY TO GET MORE.: NEVER TRUE

## 2022-12-14 ASSESSMENT — PATIENT HEALTH QUESTIONNAIRE - PHQ9
SUM OF ALL RESPONSES TO PHQ QUESTIONS 1-9: 0
2. FEELING DOWN, DEPRESSED OR HOPELESS: 0
SUM OF ALL RESPONSES TO PHQ QUESTIONS 1-9: 0
1. LITTLE INTEREST OR PLEASURE IN DOING THINGS: 0
SUM OF ALL RESPONSES TO PHQ QUESTIONS 1-9: 0
SUM OF ALL RESPONSES TO PHQ QUESTIONS 1-9: 0
SUM OF ALL RESPONSES TO PHQ9 QUESTIONS 1 & 2: 0

## 2022-12-14 ASSESSMENT — SOCIAL DETERMINANTS OF HEALTH (SDOH): HOW HARD IS IT FOR YOU TO PAY FOR THE VERY BASICS LIKE FOOD, HOUSING, MEDICAL CARE, AND HEATING?: NOT HARD AT ALL

## 2022-12-14 NOTE — PROGRESS NOTES
Chief Complaint   Patient presents with    Results     Mammogram results     Assessment/Plan:  Anuel Nicolas was seen today for results. Diagnoses and all orders for this visit:    Routine adult health maintenance  -     CBC with Auto Differential; Future  -     Comprehensive Metabolic Panel; Future  -     Lipid Panel; Future  -     T4, Free; Future  -     TSH; Future    Allergic rhinitis  -     fluticasone (FLONASE) 50 MCG/ACT nasal spray; 1 spray by Nasal route daily    Heart palpitations  -     Stress test, myoview; Future  -     EKG 12 lead  -     Cancel: CARDIAC STRESS TEST EXERCISE ONLY; Future    Coronary artery calcification  -     Stress test, myoview; Future  -     Cancel: CARDIAC STRESS TEST EXERCISE ONLY; Future    The 10-year ASCVD risk score (Rigoberto MENDES, et al., 2019) is: 4.2%    Values used to calculate the score:      Age: 56 years      Sex: Female      Is Non- : Yes      Diabetic: No      Tobacco smoker: No      Systolic Blood Pressure: 226 mmHg      Is BP treated: No      HDL Cholesterol: 70 mg/dL      Total Cholesterol: 272 mg/dL    Vitals:    12/14/22 1005   BP: 136/78   Pulse: 67   Temp: 97.1 °F (36.2 °C)   SpO2: 98%     Physical Exam  Vitals reviewed. Constitutional:       General: She is not in acute distress. Appearance: She is well-developed. She is not diaphoretic. HENT:      Head: Normocephalic and atraumatic. Pulmonary:      Effort: Pulmonary effort is normal.   Neurological:      Mental Status: She is alert and oriented to person, place, and time. Cranial Nerves: No cranial nerve deficit. Psychiatric:         Behavior: Behavior normal.         Thought Content:  Thought content normal.         Judgment: Judgment normal.       PHQ Scores 12/14/2022 8/4/2021 4/26/2019 5/8/2018   PHQ2 Score 0 0 0 0   PHQ9 Score 0 0 0 0     Interpretation of Total Score Depression Severity: 1-4 = Minimal depression, 5-9 = Mild depression, 10-14 = Moderate depression, 15-19 = Moderately severe depression, 20-27 = Severe depression    Kaushal Rivera MD  [address]

## 2022-12-14 NOTE — RESULT ENCOUNTER NOTE
Lynsey Cancino      Your recent lab results are normal.  The 10-year ASCVD risk score (Rigoberto MENDES, et al., 2019) is: 4.6%    Values used to calculate the score:      Age: 56 years      Sex: Female      Is Non- : Yes      Diabetic: No      Tobacco smoker: No      Systolic Blood Pressure: 414 mmHg      Is BP treated: No      HDL Cholesterol: 70 mg/dL      Total Cholesterol: 301 mg/dL      Rosa Charles MD  FACP

## 2022-12-19 RX ORDER — ROSUVASTATIN CALCIUM 10 MG/1
10 TABLET, COATED ORAL DAILY
Qty: 90 TABLET | Refills: 1 | Status: SHIPPED | OUTPATIENT
Start: 2022-12-19 | End: 2022-12-27 | Stop reason: SDUPTHER

## 2022-12-27 ENCOUNTER — HOSPITAL ENCOUNTER (OUTPATIENT)
Dept: NON INVASIVE DIAGNOSTICS | Age: 57
Discharge: HOME | End: 2022-12-27
Payer: MEDICAID

## 2022-12-27 DIAGNOSIS — I25.10 CORONARY ARTERY CALCIFICATION: ICD-10-CM

## 2022-12-27 DIAGNOSIS — R00.2 HEART PALPITATIONS: ICD-10-CM

## 2022-12-27 DIAGNOSIS — E78.2 MIXED HYPERLIPIDEMIA: Primary | ICD-10-CM

## 2022-12-27 DIAGNOSIS — I25.84 CORONARY ARTERY CALCIFICATION: ICD-10-CM

## 2022-12-27 PROCEDURE — 3430000000 HC RX DIAGNOSTIC RADIOPHARMACEUTICAL: Performed by: INTERNAL MEDICINE

## 2022-12-27 PROCEDURE — 78452 HT MUSCLE IMAGE SPECT MULT: CPT | Performed by: INTERNAL MEDICINE

## 2022-12-27 PROCEDURE — A9502 TC99M TETROFOSMIN: HCPCS | Performed by: INTERNAL MEDICINE

## 2022-12-27 PROCEDURE — 93017 CV STRESS TEST TRACING ONLY: CPT | Performed by: INTERNAL MEDICINE

## 2022-12-27 RX ORDER — ROSUVASTATIN CALCIUM 20 MG/1
20 TABLET, COATED ORAL DAILY
Qty: 90 TABLET | Refills: 4 | Status: SHIPPED | OUTPATIENT
Start: 2022-12-27

## 2022-12-27 RX ADMIN — TETROFOSMIN 30 MILLICURIE: 1.38 INJECTION, POWDER, LYOPHILIZED, FOR SOLUTION INTRAVENOUS at 09:40

## 2022-12-27 RX ADMIN — TETROFOSMIN 10 MILLICURIE: 1.38 INJECTION, POWDER, LYOPHILIZED, FOR SOLUTION INTRAVENOUS at 08:25

## 2022-12-27 NOTE — PROGRESS NOTES
Patient instructed on Bk Protocol Stress Test Procedure including possible side effects and adverse reactions. Verbalizes knowledge and understanding and denies having any questions.

## 2022-12-28 DIAGNOSIS — I20.8 STABLE ANGINA PECTORIS (HCC): Primary | ICD-10-CM

## 2022-12-30 DIAGNOSIS — I51.9 CARDIAC DISEASE: Primary | ICD-10-CM

## 2023-01-12 ENCOUNTER — HOSPITAL ENCOUNTER (OUTPATIENT)
Dept: CT IMAGING | Age: 58
Discharge: HOME | End: 2023-01-12
Payer: COMMERCIAL

## 2023-01-12 DIAGNOSIS — I51.9 CARDIAC DISEASE: ICD-10-CM

## 2023-01-12 PROCEDURE — 75571 CT HRT W/O DYE W/CA TEST: CPT

## 2023-01-12 NOTE — RESULT ENCOUNTER NOTE
Deneice Grammes      Your recent lab results are normal.    Kaushal Rivera MD  [address]

## 2023-01-13 ENCOUNTER — OFFICE VISIT (OUTPATIENT)
Dept: INTERNAL MEDICINE CLINIC | Age: 58
End: 2023-01-13
Payer: COMMERCIAL

## 2023-01-13 VITALS
DIASTOLIC BLOOD PRESSURE: 70 MMHG | HEART RATE: 81 BPM | TEMPERATURE: 97.3 F | BODY MASS INDEX: 22.15 KG/M2 | SYSTOLIC BLOOD PRESSURE: 112 MMHG | WEIGHT: 125 LBS | HEIGHT: 63 IN | OXYGEN SATURATION: 95 %

## 2023-01-13 DIAGNOSIS — Z00.00 ENCOUNTER FOR WELL ADULT EXAM WITHOUT ABNORMAL FINDINGS: Primary | ICD-10-CM

## 2023-01-13 DIAGNOSIS — Z23 NEED FOR DIPHTHERIA-TETANUS-PERTUSSIS (TDAP) VACCINE: ICD-10-CM

## 2023-01-13 DIAGNOSIS — E78.2 MIXED HYPERLIPIDEMIA: ICD-10-CM

## 2023-01-13 DIAGNOSIS — D18.03 HEMANGIOMA OF INTRA-ABDOMINAL STRUCTURE: ICD-10-CM

## 2023-01-13 DIAGNOSIS — Z23 NEED FOR TDAP VACCINATION: ICD-10-CM

## 2023-01-13 PROCEDURE — G8484 FLU IMMUNIZE NO ADMIN: HCPCS | Performed by: INTERNAL MEDICINE

## 2023-01-13 PROCEDURE — 90471 IMMUNIZATION ADMIN: CPT | Performed by: INTERNAL MEDICINE

## 2023-01-13 PROCEDURE — 99396 PREV VISIT EST AGE 40-64: CPT | Performed by: INTERNAL MEDICINE

## 2023-01-13 PROCEDURE — 90715 TDAP VACCINE 7 YRS/> IM: CPT | Performed by: INTERNAL MEDICINE

## 2023-01-13 RX ORDER — ICOSAPENT ETHYL 1000 MG/1
2 CAPSULE ORAL 2 TIMES DAILY
Qty: 60 CAPSULE | Refills: 3 | Status: SHIPPED | OUTPATIENT
Start: 2023-01-13

## 2023-01-13 ASSESSMENT — PATIENT HEALTH QUESTIONNAIRE - PHQ9
SUM OF ALL RESPONSES TO PHQ9 QUESTIONS 1 & 2: 0
SUM OF ALL RESPONSES TO PHQ QUESTIONS 1-9: 0
1. LITTLE INTEREST OR PLEASURE IN DOING THINGS: 0
SUM OF ALL RESPONSES TO PHQ QUESTIONS 1-9: 0
2. FEELING DOWN, DEPRESSED OR HOPELESS: 0

## 2023-01-13 ASSESSMENT — ENCOUNTER SYMPTOMS
BLOOD IN STOOL: 0
SHORTNESS OF BREATH: 0
CONSTIPATION: 0
COUGH: 0
DIARRHEA: 0

## 2023-01-13 NOTE — PROGRESS NOTES
Well Adult Note  Name: Carol Ann Young Date: 2023   MRN: [id number] Sex: Female   Age: 56 y.o. Ethnicity: Non- / Non    : 1965 Race: Neal Alegria / African American      Mario Alberto Keenan is here for well adult exam.  History:  Liver cyst and hypertension      Review of Systems   Constitutional:  Negative for chills and fatigue. HENT:  Negative for congestion. Respiratory:  Negative for cough and shortness of breath. Cardiovascular:  Negative for chest pain and palpitations. Gastrointestinal:  Negative for blood in stool, constipation and diarrhea. Genitourinary:  Negative for dysuria and urgency. Psychiatric/Behavioral:  Negative for behavioral problems, sleep disturbance and suicidal ideas. Allergies   Allergen Reactions    Sulfa Antibiotics Other (See Comments)     Burning from sulfa cream         Prior to Visit Medications    Medication Sig Taking? Authorizing Provider   Tetanus-Diphth-Acell Pertussis (BOOSTRIX) 5-2.5-18.5 LF-MCG/0.5 injection Inject 0.5 mLs into the muscle once for 1 dose Yes Tiffany Hernandez MD   Icosapent Ethyl (VASCEPA) 1 g CAPS capsule Take 2 capsules by mouth 2 times daily Yes Tiffany Hernandez MD   fluticasone (FLONASE) 50 MCG/ACT nasal spray 1 spray by Nasal route daily Yes Tiffany Hernandez MD   Multiple Vitamins-Minerals (THERAPEUTIC MULTIVITAMIN-MINERALS) tablet Take 1 tablet by mouth daily Yes Historical Provider, MD   Omega-3 Fatty Acids (FISH OIL PO) Take by mouth Yes Historical Provider, MD   ibuprofen (IBU) 600 MG tablet Take 1 tablet by mouth every 6 hours as needed for Pain.  Yes Juan Samson PA-C   rosuvastatin (CRESTOR) 20 MG tablet Take 1 tablet by mouth daily   MD Julian   diclofenac (CATAFLAM) 50 MG tablet Take 1 tablet by mouth 2 times daily  Patient not taking: Reported on 2023  Tiffany Hernandez MD         Past Medical History:   Diagnosis Date    Allergic rhinitis 2011    Fibrocystic breast 2018 Hyperlipidemia     Osteoarthritis of knee 2011    Osteoarthritis of right knee     Pneumonia     Tenosynovitis of foot 2013       Past Surgical History:   Procedure Laterality Date    BREAST LUMPECTOMY      HERNIA REPAIR           Family History   Problem Relation Age of Onset    Diabetes Mother     Asthma Mother     Osteoporosis Mother     Hypertension Mother     Arthritis Mother     Alcohol Abuse Father     Other Father         ulcers, aneurysm    Heart Attack Father     Alcohol Abuse Brother     Asthma Brother     Heart Attack Paternal Uncle     Diabetes Maternal Grandmother     Heart Attack Maternal Grandmother     Other Maternal Grandmother         pancreatic ca    Depression Paternal Grandmother     Brain Cancer Paternal Grandmother     Cancer Paternal Grandmother         skin ca       Social History     Tobacco Use    Smoking status: Former     Types: Cigarettes     Quit date: 1991     Years since quittin.6    Smokeless tobacco: Never   Substance Use Topics    Alcohol use: No     Comment: Alcoholic       Objective   /70   Pulse 81   Temp 97.3 °F (36.3 °C) (Infrared)   Ht 5' 3\" (1.6 m)   Wt 125 lb (56.7 kg)   SpO2 95%   BMI 22.14 kg/m²   Wt Readings from Last 3 Encounters:   23 125 lb (56.7 kg)   22 128 lb (58.1 kg)   21 128 lb 9.6 oz (58.3 kg)     There were no vitals filed for this visit. Physical Exam  Vitals reviewed. Constitutional:       General: She is not in acute distress. Appearance: Normal appearance. She is well-developed. She is not diaphoretic. HENT:      Head: Normocephalic and atraumatic. Right Ear: Tympanic membrane, ear canal and external ear normal. There is no impacted cerumen. Left Ear: Tympanic membrane, ear canal and external ear normal. There is no impacted cerumen. Nose: Nose normal. No nasal deformity, congestion or rhinorrhea.       Mouth/Throat:      Mouth: Mucous membranes are moist. No lacerations or oral lesions. Dentition: Does not have dentures. Pharynx: Oropharynx is clear. No oropharyngeal exudate, posterior oropharyngeal erythema or uvula swelling. Eyes:      General:         Right eye: No discharge. Left eye: No discharge. Extraocular Movements: Extraocular movements intact. Conjunctiva/sclera: Conjunctivae normal.      Pupils: Pupils are equal, round, and reactive to light. Neck:      Thyroid: No thyroid mass or thyromegaly. Vascular: Normal carotid pulses. No carotid bruit, hepatojugular reflux or JVD. Trachea: Trachea normal. No tracheal tenderness or tracheal deviation. Cardiovascular:      Rate and Rhythm: Normal rate and regular rhythm. Chest Wall: PMI is not displaced. Pulses: Normal pulses. Heart sounds: Normal heart sounds, S1 normal and S2 normal. Heart sounds not distant. No murmur heard. No systolic murmur is present. No diastolic murmur is present. No friction rub. No gallop. No S3 or S4 sounds. Pulmonary:      Effort: Pulmonary effort is normal. No respiratory distress. Breath sounds: Normal breath sounds. No stridor. No wheezing, rhonchi or rales. Chest:      Chest wall: No tenderness. Abdominal:      General: Bowel sounds are normal. There is no distension or abdominal bruit. Palpations: Abdomen is soft. There is no shifting dullness or mass. Tenderness: There is no abdominal tenderness. There is no right CVA tenderness, left CVA tenderness, guarding or rebound. Hernia: No hernia is present. Musculoskeletal:         General: No swelling, tenderness, deformity or signs of injury. Cervical back: Full passive range of motion without pain, normal range of motion and neck supple. No edema or rigidity. Right lower leg: No edema. Left lower leg: No edema. Lymphadenopathy:      Cervical: No cervical adenopathy. Skin:     General: Skin is warm and dry.       Coloration: Skin is not pale.      Findings: No rash. Neurological:      Mental Status: She is alert and oriented to person, place, and time. Cranial Nerves: No cranial nerve deficit. Psychiatric:         Mood and Affect: Mood is not anxious. Speech: Speech normal.         Behavior: Behavior normal. Behavior is not agitated. Thought Content: Thought content normal.         Judgment: Judgment normal.     Assessment   Plan   1. Encounter for well adult exam without abnormal findings  2. Hemangioma of intra-abdominal structure  -     MRI ABDOMEN W WO CONTRAST; Future  3. Mixed hyperlipidemia  -     Lipid Panel; Future  -     Icosapent Ethyl (VASCEPA) 1 g CAPS capsule; Take 2 capsules by mouth 2 times daily, Disp-60 capsule, R-3Normal  4. Need for diphtheria-tetanus-pertussis (Tdap) vaccine  -     Tetanus-Diphth-Acell Pertussis (BOOSTRIX) 5-2.5-18.5 LF-MCG/0.5 injection; Inject 0.5 mLs into the muscle once for 1 dose, Disp-0.5 mL, R-0NO PRINT  5.  Need for Tdap vaccination  -     Tdap, [address], (age 8 yrs+), IM     Personalized Preventive Plan   Current Health Maintenance Status  Immunization History   Administered Date(s) Administered    COVID-19, MODERNA BLUE border, Primary or Immunocompromised, (age 12y+), IM, 100 mcg/0.5mL 10/09/2021    DTaP 07/13/2012    DTaP vaccine 07/13/2012    Influenza Vaccine, unspecified formulation 12/07/2016    Influenza Virus Vaccine 11/11/2011, 10/30/2012, 11/14/2013    MMR 11/03/2005    PPD Test 04/25/2012, 04/16/2013        Health Maintenance   Topic Date Due    Colorectal Cancer Screen  Never done    Shingles vaccine (1 of 2) Never done    COVID-19 Vaccine (2 - Moderna series) 11/06/2021    DTaP/Tdap/Td vaccine (2 - Tdap) 07/13/2022    Cervical cancer screen  01/30/2023    Flu vaccine (1) 12/14/2023 (Originally 8/1/2022)    Lipids  12/14/2023    Depression Screen  12/14/2023    Breast cancer screen  12/07/2024    Hepatitis C screen  Completed    HIV screen  Completed    Hepatitis A vaccine  Aged Out    Hib vaccine  Aged Out    Meningococcal (ACWY) vaccine  Aged Out    Pneumococcal 0-64 years Vaccine  Aged Out     Recommendations for [place] Due: see orders and patient instructions/AVS.    Return in about 1 year (around 1/13/2024) for Wellness.     Mehran Prasad MD  FACP no

## 2023-02-06 ENCOUNTER — HOSPITAL ENCOUNTER (OUTPATIENT)
Age: 58
Discharge: HOME | End: 2023-02-06
Payer: COMMERCIAL

## 2023-02-06 DIAGNOSIS — E78.2 MIXED HYPERLIPIDEMIA: ICD-10-CM

## 2023-02-06 LAB
ALT SERPL-CCNC: 9 U/L (ref 10–40)
AST SERPL-CCNC: 13 U/L (ref 15–37)
CHOLESTEROL, FASTING: 194 MG/DL (ref 0–199)
HDLC SERPL-MCNC: 74 MG/DL (ref 40–60)
LDL CHOLESTEROL CALCULATED: 110 MG/DL
TRIGLYCERIDE, FASTING: 49 MG/DL (ref 0–150)
VLDLC SERPL CALC-MCNC: 10 MG/DL

## 2023-02-06 PROCEDURE — 84450 TRANSFERASE (AST) (SGOT): CPT

## 2023-02-06 PROCEDURE — 84460 ALANINE AMINO (ALT) (SGPT): CPT

## 2023-02-06 PROCEDURE — 80061 LIPID PANEL: CPT

## 2023-02-06 PROCEDURE — 36415 COLL VENOUS BLD VENIPUNCTURE: CPT

## 2023-02-07 ENCOUNTER — OFFICE VISIT (OUTPATIENT)
Dept: PRIMARY CARE CLINIC | Age: 58
End: 2023-02-07
Payer: COMMERCIAL

## 2023-02-07 VITALS
SYSTOLIC BLOOD PRESSURE: 108 MMHG | WEIGHT: 126 LBS | OXYGEN SATURATION: 97 % | HEIGHT: 63 IN | TEMPERATURE: 98.6 F | DIASTOLIC BLOOD PRESSURE: 62 MMHG | HEART RATE: 65 BPM | RESPIRATION RATE: 16 BRPM | BODY MASS INDEX: 22.32 KG/M2

## 2023-02-07 DIAGNOSIS — K76.89 LIVER NODULE: ICD-10-CM

## 2023-02-07 DIAGNOSIS — H00.013 HORDEOLUM EXTERNUM OF RIGHT EYE, UNSPECIFIED EYELID: Primary | ICD-10-CM

## 2023-02-07 DIAGNOSIS — M25.511 RIGHT SHOULDER PAIN, UNSPECIFIED CHRONICITY: ICD-10-CM

## 2023-02-07 PROCEDURE — 99214 OFFICE O/P EST MOD 30 MIN: CPT | Performed by: INTERNAL MEDICINE

## 2023-02-07 RX ORDER — AMOXICILLIN AND CLAVULANATE POTASSIUM 500; 125 MG/1; MG/1
TABLET, FILM COATED ORAL
COMMUNITY
Start: 2023-02-06

## 2023-02-07 SDOH — ECONOMIC STABILITY: FOOD INSECURITY: WITHIN THE PAST 12 MONTHS, THE FOOD YOU BOUGHT JUST DIDN'T LAST AND YOU DIDN'T HAVE MONEY TO GET MORE.: NEVER TRUE

## 2023-02-07 SDOH — ECONOMIC STABILITY: FOOD INSECURITY: WITHIN THE PAST 12 MONTHS, YOU WORRIED THAT YOUR FOOD WOULD RUN OUT BEFORE YOU GOT MONEY TO BUY MORE.: NEVER TRUE

## 2023-02-07 SDOH — ECONOMIC STABILITY: INCOME INSECURITY: HOW HARD IS IT FOR YOU TO PAY FOR THE VERY BASICS LIKE FOOD, HOUSING, MEDICAL CARE, AND HEATING?: NOT HARD AT ALL

## 2023-02-07 SDOH — HEALTH STABILITY: PHYSICAL HEALTH: ON AVERAGE, HOW MANY MINUTES DO YOU ENGAGE IN EXERCISE AT THIS LEVEL?: 30 MIN

## 2023-02-07 SDOH — HEALTH STABILITY: PHYSICAL HEALTH: ON AVERAGE, HOW MANY DAYS PER WEEK DO YOU ENGAGE IN MODERATE TO STRENUOUS EXERCISE (LIKE A BRISK WALK)?: 2 DAYS

## 2023-02-07 SDOH — ECONOMIC STABILITY: HOUSING INSECURITY
IN THE LAST 12 MONTHS, WAS THERE A TIME WHEN YOU DID NOT HAVE A STEADY PLACE TO SLEEP OR SLEPT IN A SHELTER (INCLUDING NOW)?: NO

## 2023-02-07 ASSESSMENT — SOCIAL DETERMINANTS OF HEALTH (SDOH)
WITHIN THE LAST YEAR, HAVE YOU BEEN HUMILIATED OR EMOTIONALLY ABUSED IN OTHER WAYS BY YOUR PARTNER OR EX-PARTNER?: NO
WITHIN THE LAST YEAR, HAVE YOU BEEN KICKED, HIT, SLAPPED, OR OTHERWISE PHYSICALLY HURT BY YOUR PARTNER OR EX-PARTNER?: NO
WITHIN THE LAST YEAR, HAVE YOU BEEN AFRAID OF YOUR PARTNER OR EX-PARTNER?: NO
WITHIN THE LAST YEAR, HAVE TO BEEN RAPED OR FORCED TO HAVE ANY KIND OF SEXUAL ACTIVITY BY YOUR PARTNER OR EX-PARTNER?: NO

## 2023-02-07 NOTE — PROGRESS NOTES
Fransisca Huerta   Date ofBirth:  1965    Date of Visit:  2/7/2023    Chief Complaint   Patient presents with    New Patient    Eye Pain     Sty on right eye        HPI  Patient presents to establish care. Patient has stye right upper eyelid x 1.5 weeks. Patient denies pus. Patient states it is tender. Patient denies fever. Patient states it has increased in size. Patient states she saw her Eye Doctor who prescribed Augmentin and she has taken it twice. Patient wants Physical Therapy for right shoulder. Patient states shoulder is painful or she feels a catch periodically. Patient states pain is sharp and catches her attention. Patient states she can raise it but sometimes has to back up then raise it. Patient has had it for a few months. Patient denies injury. Can raise it sometimes has to back up then raise it    Patient states she had a CT calcium scoring that shows a nodule on liver. Review of Systems   Constitutional:  Negative for appetite change, chills, fatigue and fever. HENT:  Negative for congestion, ear pain, postnasal drip, rhinorrhea, sinus pressure, sinus pain, sneezing and sore throat. Eyes:  Negative for photophobia, pain, discharge, redness, itching and visual disturbance. Stye on right eye that is tender   Respiratory:  Negative for cough, chest tightness, shortness of breath and wheezing. Cardiovascular:  Negative for chest pain, palpitations and leg swelling. Gastrointestinal:  Negative for abdominal distention, abdominal pain, blood in stool, constipation, diarrhea, nausea and vomiting. Genitourinary:  Negative for dysuria, frequency and hematuria. Musculoskeletal:  Positive for arthralgias. Negative for back pain and myalgias. Skin:  Negative for rash. Neurological:  Negative for dizziness, tremors, syncope, weakness, light-headedness, numbness and headaches. Psychiatric/Behavioral:  Negative for dysphoric mood and sleep disturbance. The patient is not nervous/anxious. Allergies   Allergen Reactions    Sulfa Antibiotics Other (See Comments)     Burning from sulfa cream     Outpatient Medications Marked as Taking for the 2/7/23 encounter (Office Visit) with Sandy Vides MD   Medication Sig Dispense Refill    amoxicillin-clavulanate (AUGMENTIN) 500-125 MG per tablet       rosuvastatin (CRESTOR) 20 MG tablet Take 1 tablet by mouth daily 90 tablet 4    fluticasone (FLONASE) 50 MCG/ACT nasal spray 1 spray by Nasal route daily 3 each 1    Multiple Vitamins-Minerals (THERAPEUTIC MULTIVITAMIN-MINERALS) tablet Take 1 tablet by mouth daily      ibuprofen (IBU) 600 MG tablet Take 1 tablet by mouth every 6 hours as needed for Pain. 20 tablet 0         Vitals:    02/07/23 1457   BP: 108/62   Pulse: 65   Resp: 16   Temp: 98.6 °F (37 °C)   SpO2: 97%   Weight: 126 lb (57.2 kg)   Height: 5' 3\" (1.6 m)     Body mass index is 22.32 kg/m². Physical Exam  Nursing note reviewed. Constitutional:       General: She is not in acute distress. Appearance: Normal appearance. She is well-developed. HENT:      Mouth/Throat:      Pharynx: Oropharynx is clear. Eyes:      Extraocular Movements: Extraocular movements intact. Pupils: Pupils are equal, round, and reactive to light. Neck:      Thyroid: No thyromegaly. Vascular: No carotid bruit. Cardiovascular:      Rate and Rhythm: Normal rate and regular rhythm. Heart sounds: Normal heart sounds, S1 normal and S2 normal. No murmur heard. Pulmonary:      Effort: Pulmonary effort is normal.      Breath sounds: Normal breath sounds. Musculoskeletal:      Right shoulder: No tenderness. Normal range of motion. Cervical back: Neck supple. Right lower leg: No edema. Left lower leg: No edema. Neurological:      Mental Status: She is alert. No results found for this visit on 02/07/23.   Lab Review   Hospital Outpatient Visit on 02/06/2023   Component Date Value Cholesterol, Fasting 02/06/2023 194     Triglyceride, Fasting 02/06/2023 49     HDL 02/06/2023 74 (A)     LDL Calculated 02/06/2023 110 (A)     VLDL Cholesterol Calcula* 02/06/2023 10     ALT 02/06/2023 9 (A)     AST 02/06/2023 13 (A)    Hospital Outpatient Visit on 12/27/2022   Component Date Value    Left Ventricular Ejectio* 12/27/2022 68     LVEF MODALITY 12/27/2022 Nuclear    Orders Only on 12/14/2022   Component Date Value    WBC 12/14/2022 7.2     RBC 12/14/2022 5.36 (A)     Hemoglobin 12/14/2022 12.8     Hematocrit 12/14/2022 39.2     MCV 12/14/2022 73.1 (A)     MCH 12/14/2022 23.9 (A)     MCHC 12/14/2022 32.7     RDW 12/14/2022 14.6     Platelets 07/99/9563 253     MPV 12/14/2022 10.0     Neutrophils % 12/14/2022 65.4     Lymphocytes % 12/14/2022 24.0     Monocytes % 12/14/2022 6.7     Eosinophils % 12/14/2022 3.1     Basophils % 12/14/2022 0.8     Neutrophils Absolute 12/14/2022 4.7     Lymphocytes Absolute 12/14/2022 1.7     Monocytes Absolute 12/14/2022 0.5     Eosinophils Absolute 12/14/2022 0.2     Basophils Absolute 12/14/2022 0.1     Sodium 12/14/2022 142     Potassium 12/14/2022 4.5     Chloride 12/14/2022 103     CO2 12/14/2022 26     Anion Gap 12/14/2022 13     Glucose 12/14/2022 82     BUN 12/14/2022 10     Creatinine 12/14/2022 0.7     Est, Glom Filt Rate 12/14/2022 >60     Calcium 12/14/2022 9.9     Total Protein 12/14/2022 7.2     Albumin 12/14/2022 4.5     Albumin/Globulin Ratio 12/14/2022 1.7     Total Bilirubin 12/14/2022 0.3     Alkaline Phosphatase 12/14/2022 70     ALT 12/14/2022 7 (A)     AST 12/14/2022 13 (A)     Cholesterol, Total 12/14/2022 301 (A)     Triglycerides 12/14/2022 54     HDL 12/14/2022 70 (A)     LDL Calculated 12/14/2022 220 (A)     VLDL Cholesterol Calcula* 12/14/2022 11     T4 Free 12/14/2022 1.2     TSH 12/14/2022 1.04          Assessment/Plan     1.  Hordeolum externum of right eye, unspecified eyelid  - ciprofloxacin HCl (CILOXAN) 0.3 % ophthalmic ointment; Place 0.5 inches into the right eye 3 times daily for 10 days 3 times daily. Dispense: 3.5 g; Refill: 0  -warm moist compresses   -avoid eye makeup   -Referral to [facility] for stye if no improvement    2. Right shoulder pain, unspecified chronicity  -Tylenol or Advil as needed  - Referral to Frankey Alf, MD, Orthopedics and Sports Medicine (Hip; Knee; Shoulder), [place]    3. Liver nodule  -CT cardiac calcium scoring shows a dominant hypodense nodule in the right hepatic lobe most commonly likely a cyst or hemangioma. Abdominal MRI recommended for further characterization. -MRI order is in EPIC      Discussed medications with patient, who voiced understanding of their use and indications. All questions answered. Return if symptoms worsen or fail to improve.

## 2023-02-08 ENCOUNTER — OFFICE VISIT (OUTPATIENT)
Dept: CARDIOLOGY CLINIC | Age: 58
End: 2023-02-08
Payer: COMMERCIAL

## 2023-02-08 VITALS
BODY MASS INDEX: 22.55 KG/M2 | WEIGHT: 127.3 LBS | DIASTOLIC BLOOD PRESSURE: 64 MMHG | OXYGEN SATURATION: 98 % | HEIGHT: 63 IN | SYSTOLIC BLOOD PRESSURE: 108 MMHG | HEART RATE: 84 BPM

## 2023-02-08 DIAGNOSIS — Z82.49 FAMILY HISTORY OF HEART DISEASE: ICD-10-CM

## 2023-02-08 DIAGNOSIS — R00.2 PALPITATIONS: ICD-10-CM

## 2023-02-08 DIAGNOSIS — E78.2 MIXED HYPERLIPIDEMIA: Primary | ICD-10-CM

## 2023-02-08 PROCEDURE — 99204 OFFICE O/P NEW MOD 45 MIN: CPT | Performed by: INTERNAL MEDICINE

## 2023-02-08 PROCEDURE — 93000 ELECTROCARDIOGRAM COMPLETE: CPT | Performed by: INTERNAL MEDICINE

## 2023-02-08 RX ORDER — ROSUVASTATIN CALCIUM 40 MG/1
40 TABLET, COATED ORAL DAILY
Qty: 90 TABLET | Refills: 4 | Status: SHIPPED | OUTPATIENT
Start: 2023-02-08

## 2023-02-08 NOTE — PROGRESS NOTES
[facility]  Cardiac Consult     Referring Provider:  Tanesha Ace MD     Chief Complaint   Patient presents with    New Patient    Hyperlipidemia        History of Present Illness:  56 y.o. female seen as a new patient for palpitations and hyperlipidemia. She has a family h/o hyperlipidemia in her mother and father. Her father  of o ruptured aortic aneurysm. She has a h/o hyperlipidemia as well with LDL running in the 220 range. Over the last few months she started noticing palpitations and some chest tightness. Palpitations lasting seconds with no associated symptoms. Mild tightness with no clear exacerbating or relieving factors. Due to this she had a stress myoview that was normal. She also had a coronary calcium score that was normal. She decided to treat her lipids with crestor and repeat [id number]. Past Medical History:   has a past medical history of Allergic rhinitis, Fibrocystic breast, Hyperlipidemia, Osteoarthritis of knee, Osteoarthritis of right knee, Pneumonia, and Tenosynovitis of foot. Surgical History:   has a past surgical history that includes hernia repair and Breast lumpectomy (). Social History:  Social History     Tobacco Use    Smoking status: Former     Types: Cigarettes     Quit date: 1991     Years since quittin.7    Smokeless tobacco: Never   Substance Use Topics    Alcohol use: No     Comment: Alcoholic        Family History:  family history includes Alcohol Abuse in her brother and father; Arthritis in her mother; Asthma in her brother and mother; Brain Cancer in her paternal grandmother; Cancer in her paternal grandmother; Depression in her paternal grandmother; Diabetes in her maternal grandmother and mother; Heart Attack in her father, maternal grandmother, and paternal uncle; Hypertension in her mother; Osteoporosis in her mother; Other in her father and maternal grandmother.      Allergies:  Sulfa antibiotics     Home Medications:  Prior to Visit Medications    Medication Sig Taking? Authorizing Provider   amoxicillin-clavulanate (AUGMENTIN) 500-125 MG per tablet  Yes Historical Provider, MD   ciprofloxacin HCl (CILOXAN) 0.3 % ophthalmic ointment Place 0.5 inches into the right eye 3 times daily for 10 days 3 times daily. Yes Giovana Pinedo MD   rosuvastatin (CRESTOR) 20 MG tablet Take 1 tablet by mouth daily Yes Julianne Soria MD   fluticasone Anu Sethi) 50 MCG/ACT nasal spray 1 spray by Nasal route daily Yes Jadiel Valera MD   Multiple Vitamins-Minerals (THERAPEUTIC MULTIVITAMIN-MINERALS) tablet Take 1 tablet by mouth daily Yes Historical Provider, MD   ibuprofen (IBU) 600 MG tablet Take 1 tablet by mouth every 6 hours as needed for Pain. Yes Kaylee Zayas PA-C   Icosapent Ethyl (VASCEPA) 1 g CAPS capsule Take 2 capsules by mouth 2 times daily  Patient not taking: Reported on 2/8/2023  Jadiel Valera MD   Omega-3 Fatty Acids (FISH OIL PO) Take by mouth  Patient not taking: No sig reported  Historical Provider, MD       [x] Medications and dosages reviewed.     ROS:  [x]Full ROS obtained and negative except as mentioned in HPI      PHYSICAL EXAMINATION:  Vitals:    02/08/23 1437   BP: 108/64   Site: Right Upper Arm   Position: Sitting   Cuff Size: Medium Adult   Pulse: 84   SpO2: 98%   Weight: 127 lb 4.8 oz (57.7 kg)   Height: 5' 3\" (1.6 m)        GENERAL: Well developed, well nourished, No acute distress  NEUROLOGICAL: Alert and oriented  PSYCH: Calm affect  SKIN: Warm and dry, No visible rash,   EYES: Pupils equal and round, Sclera non-icteric,   HENT:  External ears and nose unremarkable, mucus membranes moist  MUSCULOSKELETAL: Normal cephalic, neck supple  CAROTID: Normal upstroke, no bruits  CARDIAC: JVP normal, Normal PMI, regular rate and rhythm, normal S1S2, no murmur, rub, or gallop  RESPIRATORY: Normal respiratory effort, clear to auscultation bilaterally  EXTREMITIES: No edema  GASTROINTESTINAL: normal bowel sounds, soft, non-tender, No hepatomegaly     MYOVIEW 12/2022  Summary    Normal myocardial perfusion. Normal LV size and systolic function. CORONARY CALCIUM SCORE 1/2023  Total Agatston calcium score of zero (0) implies a very low likelihood of a   cardiac event over at least the next 3 years. EKG:  NSR, Normal      ASSESSMENT:    Palpations:  Very self limited. No associated symptoms  Try to limit caffeine. I do not think a monitor is needed at this time. She will call if they worsen and we can schedule a monitor    Hyperlipidemia:  Baseline [id number]=152  Improved with crestor to 110. I would like her LDL <100  Increase crestor to 40  Recheck LDL in 3 months    Liver Mass/ Hemangioma:  Seen on CT. MRI was recommended but she is unsure if she wants to proceed. I suggested that she proceed to be sure that this is a hemangioma.      PLAN:  Increase crestor to 40 with Goal LDL <100  Lipids 3 months  F/u 1 year    Thank you for allowing me to participate in the care of this individual.      Vilma Farley M.D., [facility]

## 2023-02-25 DIAGNOSIS — H00.013 HORDEOLUM EXTERNUM OF RIGHT EYE, UNSPECIFIED EYELID: Primary | ICD-10-CM

## 2023-02-25 RX ORDER — ERYTHROMYCIN 5 MG/G
OINTMENT OPHTHALMIC EVERY 6 HOURS
Qty: 3.5 G | Refills: 0 | Status: SHIPPED | OUTPATIENT
Start: 2023-02-25 | End: 2023-03-04

## 2023-02-26 PROBLEM — H00.013 HORDEOLUM EXTERNUM OF RIGHT EYE: Status: ACTIVE | Noted: 2023-02-26

## 2023-02-26 PROBLEM — K76.89 LIVER NODULE: Status: ACTIVE | Noted: 2023-02-26

## 2023-02-26 PROBLEM — M25.511 RIGHT SHOULDER PAIN: Status: ACTIVE | Noted: 2023-02-26

## 2023-02-26 ASSESSMENT — ENCOUNTER SYMPTOMS
SINUS PAIN: 0
COUGH: 0
EYE PAIN: 0
ABDOMINAL PAIN: 0
PHOTOPHOBIA: 0
CONSTIPATION: 0
EYE DISCHARGE: 0
EYE REDNESS: 0
RHINORRHEA: 0
WHEEZING: 0
NAUSEA: 0
SORE THROAT: 0
ABDOMINAL DISTENTION: 0
DIARRHEA: 0
CHEST TIGHTNESS: 0
BACK PAIN: 0
EYE ITCHING: 0
VOMITING: 0
BLOOD IN STOOL: 0
SINUS PRESSURE: 0
SHORTNESS OF BREATH: 0

## 2023-03-05 SDOH — HEALTH STABILITY: PHYSICAL HEALTH: ON AVERAGE, HOW MANY DAYS PER WEEK DO YOU ENGAGE IN MODERATE TO STRENUOUS EXERCISE (LIKE A BRISK WALK)?: 1 DAY

## 2023-03-05 SDOH — HEALTH STABILITY: PHYSICAL HEALTH: ON AVERAGE, HOW MANY MINUTES DO YOU ENGAGE IN EXERCISE AT THIS LEVEL?: 30 MIN

## 2023-03-07 ENCOUNTER — OFFICE VISIT (OUTPATIENT)
Dept: ORTHOPEDIC SURGERY | Age: 58
End: 2023-03-07

## 2023-03-07 DIAGNOSIS — M65.20 CALCIFIC TENDINITIS: Primary | ICD-10-CM

## 2023-03-07 DIAGNOSIS — M25.511 RIGHT SHOULDER PAIN, UNSPECIFIED CHRONICITY: ICD-10-CM

## 2023-03-07 NOTE — PROGRESS NOTES
Date:  3/7/2023    Name:  Antionette Garcia  Address:  [address]  [address] [facility] [place] [address]    :  1965      Age:   56 y.o.    SSN:        Medical Record Number:  [id number]    Reason for Visit:    Chief Complaint    Shoulder Pain (New patient right shoulder )      DOS:3/7/2023     HPI: Imer Stevens is a 56 y.o. female here today for an evaluation of her right shoulder. Patient is right hand dominant and reports that her pain has been present for several months with no known injury. She endorses pain with reaching and raising arm above her head. Patient denies numbness, tingling and state that the pain does not keep her up at night. She denies popping locking and instability, but notes that there is catching. She has not had formal treatment for this issue and has initiated self care with rest, anti-inflammatories, and home exercises. She is here to discuss the nature of her pain along with treatment options. ROS: All systems reviewed on patient intake form. Pertinent items are noted in HPI.         Past Medical History:   Diagnosis Date    Allergic rhinitis 2011    Fibrocystic breast 2018    Hyperlipidemia     Osteoarthritis of knee 2011    Osteoarthritis of right knee     Pneumonia     Tenosynovitis of foot 2013        Past Surgical History:   Procedure Laterality Date    BREAST LUMPECTOMY      HERNIA REPAIR         Family History   Problem Relation Age of Onset    Diabetes Mother     Asthma Mother     Osteoporosis Mother     Hypertension Mother     Arthritis Mother     Alcohol Abuse Father     Other Father         ulcers, aneurysm    Heart Attack Father     Alcohol Abuse Brother     Asthma Brother     Heart Attack Paternal Uncle     Diabetes Maternal Grandmother     Heart Attack Maternal Grandmother     Other Maternal Grandmother         pancreatic ca    Depression Paternal Grandmother     Brain Cancer Paternal Grandmother     Cancer Paternal Grandmother         skin ca       Social History     Socioeconomic History    Marital status:    Tobacco Use    Smoking status: Former     Types: Cigarettes     Quit date: 1991     Years since quittin.8    Smokeless tobacco: Never   Vaping Use    Vaping Use: Never used   Substance and Sexual Activity    Alcohol use: No     Comment: Alcoholic    Drug use: Never     Social Determinants of Health     Financial Resource Strain: Low Risk     Difficulty of Paying Living Expenses: Not hard at all   Food Insecurity: No Food Insecurity    Worried About Running Out of Food in the Last Year: Never true    [address] in the Last Year: Never true   Transportation Needs: Unknown    Lack of Transportation (Non-Medical): No   Physical Activity: Insufficiently Active    Days of Exercise per Week: 1 day    Minutes of Exercise per Session: 30 min   Intimate Partner Violence: Not At Risk    Fear of Current or Ex-Partner: No    Emotionally Abused: No    Physically Abused: No    Sexually Abused: No   Housing Stability: Unknown    Unstable Housing in the Last Year: No       Current Outpatient Medications   Medication Sig Dispense Refill    rosuvastatin (CRESTOR) 40 MG tablet Take 1 tablet by mouth daily 90 tablet 4    amoxicillin-clavulanate (AUGMENTIN) 500-125 MG per tablet       fluticasone (FLONASE) 50 MCG/ACT nasal spray 1 spray by Nasal route daily 3 each 1    Multiple Vitamins-Minerals (THERAPEUTIC MULTIVITAMIN-MINERALS) tablet Take 1 tablet by mouth daily      ibuprofen (IBU) 600 MG tablet Take 1 tablet by mouth every 6 hours as needed for Pain. 20 tablet 0     No current facility-administered medications for this visit. Allergies   Allergen Reactions    Sulfa Antibiotics Other (See Comments)     Burning from sulfa cream       Vital signs: There were no vitals taken for this visit. Neuro: Alert & oriented x 3,  normal,  no focal deficits noted. Normal affect.   Eyes: sclera clear  Ears: Normal external ear  Mouth:  No perioral lesions  Pulm: Respirations unlabored and regular  Pulse: Regular rate    Skin: Warm, well perfused    Right  Shoulder Examination:    Inspection:      No abnormal swelling. No erythema. No induration. Palpation:     Tenderness of the greater tuberosity. Acromioclavicular joint:      Nontender to palpation. Range of Motion:      Mild limitation of internal rotation. Strength:       Mild supraspinatus muscle weakness secondary to pain. Instability:       No anterior or posterior subluxation. Additional Tests:      Positive impingement findings. Vascular:       Skin warm well perfused. Neurologic:     Sensation intact to light touch. Diagnostics:  Radiology:     X-rays of the right  shoulder including Grashey, scapular Y and axillary views were obtained and reviewed in office:    Impression: right shoulder calcific tendinitis, no osteoarthritis       Assessment: 56 y.o female with calcific tendinitis right shoulder, no osteoarthritis    Plan: Pertinent imaging was reviewed. The etiology, natural history, and treatment options for the disorder were discussed. The roles of activity medication, antiinflammatories, injections, bracing, physical therapy, and surgical interventions were all described to the patient and questions were answered. Diagnoses and treatment options discussed with the patient at length. X-ray taken in office today demonstrate calcific deposit and no evidence of osteoarthritis. We believe patient is a candidate for formal supervised physical therapy. We will get her setup for this and see her back in 1 month. In addition to that we recommend that she takes anti-inflammatories to also help with relief. A handout about calcific tendinitis was given to the patient. If there is no improvement we will discuss if we need to add an intraarticular sterid injection to the current plan.  [facility] is in agreement with this plan. All questions were answered to patient's satisfaction and was encouraged to call with any further questions. Orders Placed This Encounter   Procedures    XR SHOULDER RIGHT (MIN 2 VIEWS)     Standing Status:   Future     Number of Occurrences:   1     Standing Expiration Date:   3/7/2024     Order Specific Question:   Reason for exam:     Answer:   pain     Total time spent for evaluation, education, and development of treatment plan: 43 minutes      I Zamzam Houston CMA, am scribing for and in the presence of Dr. Ivis Villanueva MD.    3/7/23 3:12 PM  Zamzam Houston CMA       I attest that I met personally with the patient, performed the described exam, reviewed the radiographic studies and medical records associated with this patient and supervised the services that are described above.      Virlinda Boast MD

## 2023-03-08 ENCOUNTER — HOSPITAL ENCOUNTER (OUTPATIENT)
Dept: PHYSICAL THERAPY | Age: 58
Setting detail: THERAPIES SERIES
Discharge: HOME | End: 2023-03-08

## 2023-03-22 ENCOUNTER — HOSPITAL ENCOUNTER (OUTPATIENT)
Dept: PHYSICAL THERAPY | Age: 58
Setting detail: THERAPIES SERIES
Discharge: HOME | End: 2023-03-22
Payer: COMMERCIAL

## 2023-03-22 PROCEDURE — 97110 THERAPEUTIC EXERCISES: CPT | Performed by: PHYSICAL THERAPIST

## 2023-03-22 PROCEDURE — 97161 PT EVAL LOW COMPLEX 20 MIN: CPT | Performed by: PHYSICAL THERAPIST

## 2023-03-22 NOTE — PLAN OF CARE
[address]  Phone [phone]  Fax [phone]      Physical Therapy Certification    Dear Dr. Tamir Julien,    We had the pleasure of evaluating the following patient for physical therapy services at [address] [place] [address]. A summary of our findings can be found in the initial assessment below. This includes our plan of care. If you have any questions or concerns regarding these findings, please do not hesitate to contact me at the office phone number checked above. Thank you for the referral.       Physician Signature:_______________________________Date:__________________  By signing above (or electronic signature), therapists plan is approved by physician      Patient: Celestino Bowles   : 1965   MRN: [id number]  Referring Physician: Dr. Tamir Julien      Evaluation Date: 3/22/2023      Medical Diagnosis Information:  Diagnosis: M65.20 (ICD-10-CM) - Calcific tendinitis   Treatment Diagnosis: M25.511 Right shoulder pain                                         Insurance information: BCBS    Precautions/ Contra-indications:     C-SSRS Triggered by Intake questionnaire (Past 2 wk assessment):   [x] No, Questionnaire did not trigger screening.   [] Yes, Patient intake triggered further evaluation      [] C-SSRS Screening completed  [] PCP notified via Plan of Care  [] Emergency services notified     Latex Allergy:  [x]NO      []YES  Preferred Language for Healthcare:   [x]English       []other:    SUBJECTIVE: Patient stated complaint: Pt complains of R shoulder pain that has been on and off for several years. She states over the last few months it has progressively getting worse with no CHARLIE. Denies instability.       Right handed     Imaging: Xrays     Relevant Medical History: OA  Functional Disability Index: UEFS 65/80    Pain Scale: 2/10  Easing factors: OTC medication   Provocative

## 2023-03-22 NOTE — FLOWSHEET NOTE
[address],  [address]  Phone: [phone]   Fax:     [phone]    Physical Therapy Daily Treatment Note  Date:  3/22/2023    Patient Name:  Shala Neely    :  1965  MRN: [id number]  Restrictions/Precautions:    Medical/Treatment Diagnosis Information:  Diagnosis: M65.20 (ICD-10-CM) - Calcific tendinitis  Treatment Diagnosis: M25.511 Right shoulder pain  Insurance/Certification information:  [facility]  Physician Information:  Dr. Barbie White  Has the plan of care been signed (Y/N):        []  Yes  [x]  No     Date of Patient follow up with Physician:       Is this a Progress Report:     []  Yes  [x]  No        If Yes:  Date Range for reporting period:  Beginning 3/22  Ending    Progress report will be due (10 Rx or 30 days whichever is less):        Recertification will be due (POC Duration  / 90 days whichever is less):          Visit # Insurance Allowable Auth Required   1 ?  []  Yes []  No        Functional Scale: UEFS 65/80    Date assessed:  3/22     Latex Allergy:  [x]NO      []YES  Preferred Language for Healthcare:   [x]English       []other:    Pain level:  2/10     SUBJECTIVE:  See eval    OBJECTIVE: See eval  Observation:   Test measurements:      RESTRICTIONS/PRECAUTIONS:     Exercises/Interventions:     Exercise/Equipment Resistance/Repetitions Other comments   Stretching/PROM     Wand     Table Slides 10x10\" flex    UE [place] ER at side  10x10\"     Pulleys     Pendulum          Isometrics     Retraction          Weight shift     Flexion     Abduction     External Rotation     Internal Rotation     Biceps     Triceps          PRE's     Flexion     Abduction     External Rotation     Internal Rotation     Shrugs w/ bkwd rolls 3x10    EXT     Reverse Flys     Serratus     Horizontal Abd with ER     Biceps     Triceps     Retraction          Cable Column/Theraband     External

## 2023-03-27 ENCOUNTER — HOSPITAL ENCOUNTER (OUTPATIENT)
Dept: PHYSICAL THERAPY | Age: 58
Setting detail: THERAPIES SERIES
Discharge: HOME | End: 2023-03-27
Payer: COMMERCIAL

## 2023-03-27 NOTE — FLOWSHEET NOTE
Physical Therapy  Cancellation/No-show Note  Patient Name:  Milton Horn  :  1965   Date:  3/27/2023  Cancelled visits to date: 01  No-shows to date: 0    For today's appointment patient:  [x]  Cancelled  []  Rescheduled appointment  []  No-show     Reason given by patient:  []  Patient ill  []  Conflicting appointment  []  No transportation    []  Conflict with work  [x]  No reason given  []  Other:     Comments:      Electronically signed by:  Shawna Oliver, PT, DPT

## 2023-03-29 ENCOUNTER — HOSPITAL ENCOUNTER (OUTPATIENT)
Dept: PHYSICAL THERAPY | Age: 58
Setting detail: THERAPIES SERIES
Discharge: HOME | End: 2023-03-29
Payer: COMMERCIAL

## 2023-03-29 PROCEDURE — 97110 THERAPEUTIC EXERCISES: CPT | Performed by: PHYSICAL THERAPIST

## 2023-03-29 PROCEDURE — 97112 NEUROMUSCULAR REEDUCATION: CPT | Performed by: PHYSICAL THERAPIST

## 2023-03-29 NOTE — FLOWSHEET NOTE
prevent re-injury. [] Progressing: [] Met: [] Not Met: [] Adjusted   2. Patient will have a decrease in pain to facilitate improvement in movement, function, and ADLs as indicated by Functional Deficits. [] Progressing: [] Met: [] Not Met: [] Adjusted     Long Term Goals: To be achieved in: 8 weeks  1. Disability index score of 5% or less for the UEFS to assist with reaching prior level of function. [] Progressing: [] Met: [] Not Met: [] Adjusted  2. Patient will demonstrate increased AROM to WNL to allow for proper joint functioning as indicated by patients Functional Deficits. [] Progressing: [] Met: [] Not Met: [] Adjusted  3. Patient will demonstrate an increase in strength to good scapular and core control  for UE MMT at least 4+/5 to allow for proper functional mobility as indicated by patients Functional Deficits. [] Progressing: [] Met: [] Not Met: [] Adjusted  4. Patient will return to [place] and reaching behind the back  functional activities without increased symptoms or restriction. [] Progressing: [] Met: [] Not Met: [] Adjusted  5. Patient will be able to sleep through the night without increased symptoms or restriction. [] Progressing: [] Met: [] Not Met: [] Adjusted                 Overall Progression Towards Functional goals/ Treatment Progress Update:  [] Patient is progressing as expected towards functional goals listed. [] Progression is slowed due to complexities/Impairments listed. [] Progression has been slowed due to co-morbidities.   [x] Plan just implemented, too soon to assess goals progression <30days   [] Goals require adjustment due to lack of progress  [] Patient is not progressing as expected and requires additional follow up with physician  [] Other    Prognosis for POC: [x] Good [] Fair  [] Poor      Patient requires continued skilled intervention: [x] Yes  [] No    Treatment/Activity Tolerance:  [x] Patient able to complete treatment  [] Patient limited by

## 2023-04-03 ENCOUNTER — HOSPITAL ENCOUNTER (OUTPATIENT)
Dept: PHYSICAL THERAPY | Age: 58
Setting detail: THERAPIES SERIES
Discharge: HOME | End: 2023-04-03
Payer: COMMERCIAL

## 2023-04-03 PROCEDURE — 97112 NEUROMUSCULAR REEDUCATION: CPT | Performed by: PHYSICAL THERAPIST

## 2023-04-03 PROCEDURE — 97110 THERAPEUTIC EXERCISES: CPT | Performed by: PHYSICAL THERAPIST

## 2023-04-03 NOTE — FLOWSHEET NOTE
tolerance, in order to prevent re-injury. [] Progressing: [] Met: [] Not Met: [] Adjusted   2. Patient will have a decrease in pain to facilitate improvement in movement, function, and ADLs as indicated by Functional Deficits. [] Progressing: [] Met: [] Not Met: [] Adjusted     Long Term Goals: To be achieved in: 8 weeks  1. Disability index score of 5% or less for the UEFS to assist with reaching prior level of function. [] Progressing: [] Met: [] Not Met: [] Adjusted  2. Patient will demonstrate increased AROM to WNL to allow for proper joint functioning as indicated by patients Functional Deficits. [] Progressing: [] Met: [] Not Met: [] Adjusted  3. Patient will demonstrate an increase in strength to good scapular and core control  for UE MMT at least 4+/5 to allow for proper functional mobility as indicated by patients Functional Deficits. [] Progressing: [] Met: [] Not Met: [] Adjusted  4. Patient will return to [place] and reaching behind the back  functional activities without increased symptoms or restriction. [] Progressing: [] Met: [] Not Met: [] Adjusted  5. Patient will be able to sleep through the night without increased symptoms or restriction. [] Progressing: [] Met: [] Not Met: [] Adjusted                 Overall Progression Towards Functional goals/ Treatment Progress Update:  [] Patient is progressing as expected towards functional goals listed. [] Progression is slowed due to complexities/Impairments listed. [] Progression has been slowed due to co-morbidities.   [x] Plan just implemented, too soon to assess goals progression <30days   [] Goals require adjustment due to lack of progress  [] Patient is not progressing as expected and requires additional follow up with physician  [] Other    Prognosis for POC: [x] Good [] Fair  [] Poor      Patient requires continued skilled intervention: [x] Yes  [] No    Treatment/Activity Tolerance:  [x] Patient able to complete treatment  []

## 2023-04-06 ENCOUNTER — HOSPITAL ENCOUNTER (OUTPATIENT)
Dept: PHYSICAL THERAPY | Age: 58
Setting detail: THERAPIES SERIES
Discharge: HOME | End: 2023-04-06
Payer: COMMERCIAL

## 2023-04-06 NOTE — FLOWSHEET NOTE
Physical Therapy  Cancellation/No-show Note  Patient Name:  Farhan Duff  :  1965   Date:  2023  Cancelled visits to date:   No-shows to date:     For today's appointment patient:  []  Cancelled  []  Rescheduled appointment  [x]  No-show     Reason given by patient:  []  Patient ill  []  Conflicting appointment  []  No transportation    []  Conflict with work  [x]  No reason given  []  Other:     Comments:      Electronically signed by:  Darrius Jovel PT, DPT

## 2023-04-18 ENCOUNTER — HOSPITAL ENCOUNTER (OUTPATIENT)
Dept: PHYSICAL THERAPY | Age: 58
Setting detail: THERAPIES SERIES
Discharge: HOME | End: 2023-04-18
Payer: COMMERCIAL

## 2023-04-18 PROCEDURE — 97110 THERAPEUTIC EXERCISES: CPT | Performed by: PHYSICAL THERAPIST

## 2023-04-18 PROCEDURE — 97112 NEUROMUSCULAR REEDUCATION: CPT | Performed by: PHYSICAL THERAPIST

## 2023-04-18 NOTE — FLOWSHEET NOTE
[address],  [address]  Phone: [phone]   Fax:     [phone]    Physical Therapy Daily Treatment Note  Date:  2023    Patient Name:  Melodie Hartley    :  1965  MRN: [id number]  Restrictions/Precautions:    Medical/Treatment Diagnosis Information:  Diagnosis: M65.20 (ICD-10-CM) - Calcific tendinitis  Treatment Diagnosis: M25.511 Right shoulder pain  Insurance/Certification information:  [facility]  Physician Information:  Dr. Rafi Flynn  Has the plan of care been signed (Y/N):        [x]  Yes  []  No     Date of Patient follow up with Physician:       Is this a Progress Report:     []  Yes  [x]  No        If Yes:  Date Range for reporting period:  Beginning 3/22  Ending    Progress report will be due (10 Rx or 30 days whichever is less):        Recertification will be due (POC Duration  / 90 days whichever is less):          Visit # Insurance Allowable Auth Required   3  4/3 6 visits 3/22- [x]  Yes []  No        Functional Scale: UEFS 65/80    Date assessed:  3/22     Latex Allergy:  [x]NO      []YES  Preferred Language for Healthcare:   [x]English       []other:    Pain level:  2/10     SUBJECTIVE:  Pt reports jolts of pain with certain movements. Overall a little better. She has averaged doing her exercises about 3x per week.      OBJECTIVE: See eval  Observation:   Test measurements:    PROM ER @ 90 deg  ~80 deg  PROM IR @ 90 deg  ~50 deg    RESTRICTIONS/PRECAUTIONS:     Exercises/Interventions:     Exercise/Equipment Resistance/Repetitions Other comments   Stretching/PROM     Wand     Table Slides 10x10\" flex    UE [place] ER at 50 deg supine 10x10\"  ^ 4/3   Pulleys 10x10\" flex Added 4/3   Sleeper  10x10\" side lying  Added 3/29        Isometrics     Retraction          Weight shift     Flexion     Abduction     External Rotation     Internal Rotation     Biceps

## 2023-04-19 ENCOUNTER — OFFICE VISIT (OUTPATIENT)
Dept: ORTHOPEDIC SURGERY | Age: 58
End: 2023-04-19

## 2023-04-19 DIAGNOSIS — M23.92 PATELLAR MALALIGNMENT SYNDROME, LEFT: ICD-10-CM

## 2023-04-19 DIAGNOSIS — M25.561 RIGHT KNEE PAIN, UNSPECIFIED CHRONICITY: ICD-10-CM

## 2023-04-19 DIAGNOSIS — M23.91 PATELLAR MALALIGNMENT SYNDROME, RIGHT: ICD-10-CM

## 2023-04-19 DIAGNOSIS — M25.562 LEFT KNEE PAIN, UNSPECIFIED CHRONICITY: Primary | ICD-10-CM

## 2023-04-19 DIAGNOSIS — M65.20 CALCIFIC TENDINITIS: ICD-10-CM

## 2023-04-19 NOTE — PROGRESS NOTES
Chief Complaint    Follow-up (Right shoulder/) and Knee Pain (Bilateral knees /)      History of Present Illness:  Melodie Hartley is a 56 y.o. female here today for follow up evaluation of the right shoulder. We have been treating her conservatively for calcific tendinitis consistent of formal, supervised physical therapy and anti-inflammatories. She feels she is doing better and continuing to trend in a good direction. She still complains of pain with certain movements and weakness but overall is improving. Denies any new injuries. She is also complaining of bilateral knee pain today ongoing chronically for years. She does not recall an associated injury. She complains of intermittent anterolateral pain exacerbated with walking, stairs and squatting. Denies catching or locking. She has not had any formal treatment for this issue. Medical History:  Patient's medications, allergies, past medical, surgical, social and family histories were reviewed and updated as appropriate. Pertinent items are noted in HPI  Review of systems reviewed from Patient History Form completed today and available in the patient's chart under the Media tab.             Past Medical History:   Diagnosis Date    Allergic rhinitis 5/13/2011    Fibrocystic breast 2018    Hyperlipidemia     Osteoarthritis of knee 5/13/2011    Osteoarthritis of right knee 2013    Pneumonia     Tenosynovitis of foot 1/14/2013        Past Surgical History:   Procedure Laterality Date    BREAST LUMPECTOMY  1989    HERNIA REPAIR         Family History   Problem Relation Age of Onset    Diabetes Mother     Asthma Mother     Osteoporosis Mother     Hypertension Mother     Arthritis Mother     Alcohol Abuse Father     Other Father         ulcers, aneurysm    Heart Attack Father     Alcohol Abuse Brother     Asthma Brother     Heart Attack Paternal Uncle     Diabetes Maternal Grandmother     Heart Attack Maternal Grandmother     Other Maternal Grandmother

## 2023-04-20 ENCOUNTER — HOSPITAL ENCOUNTER (OUTPATIENT)
Dept: PHYSICAL THERAPY | Age: 58
Setting detail: THERAPIES SERIES
Discharge: HOME | End: 2023-04-20
Payer: COMMERCIAL

## 2023-04-20 PROCEDURE — 97110 THERAPEUTIC EXERCISES: CPT | Performed by: PHYSICAL THERAPIST

## 2023-04-20 PROCEDURE — 97161 PT EVAL LOW COMPLEX 20 MIN: CPT | Performed by: PHYSICAL THERAPIST

## 2023-04-20 NOTE — THERAPY EVALUATION
The [address] [place] [address]  Phone [phone]  Fax [phone]                                                       Physical Therapy Certification    Dear Bob Thomas MD,    We had the pleasure of evaluating the following patient for physical therapy services at [address] [place] [address]. A summary of our findings can be found in the initial assessment below. This includes our plan of care. If you have any questions or concerns regarding these findings, please do not hesitate to contact me at the office phone number checked above. Thank you for the referral.       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   [] Yes, Patient intake triggered further evaluation      [] C-SSRS Screening completed  [] PCP notified via Plan of Care  [] Emergency services notified     Latex Allergy:  [x]NO      []YES  Preferred Language for Healthcare:   [x]English       []other:    SUBJECTIVE: Patient stated complaint: Pt complains of longstanding history of bilateral knee pain. She was able to manage on her own until the last 2 years.   No CHARLIE just

## 2023-04-20 NOTE — FLOWSHEET NOTE
educated on clinic layout and use of modalities. PLAN: See eval  [] Continue per plan of care [] Alter current plan (see comments above)  [x] Plan of care initiated [] Hold pending MD visit [] Discharge      Electronically signed by:  Holly Davey PT    Note: If patient does not return for scheduled/ recommended follow up visits, this note will serve as a discharge from care along with most recent update on progress.

## 2023-04-24 ENCOUNTER — APPOINTMENT (OUTPATIENT)
Dept: PHYSICAL THERAPY | Age: 58
End: 2023-04-24
Payer: COMMERCIAL

## 2023-04-26 ENCOUNTER — HOSPITAL ENCOUNTER (OUTPATIENT)
Dept: PHYSICAL THERAPY | Age: 58
Setting detail: THERAPIES SERIES
Discharge: HOME | End: 2023-04-26
Payer: COMMERCIAL

## 2023-04-26 PROCEDURE — 97110 THERAPEUTIC EXERCISES: CPT | Performed by: PHYSICAL THERAPIST

## 2023-04-26 PROCEDURE — 97112 NEUROMUSCULAR REEDUCATION: CPT | Performed by: PHYSICAL THERAPIST

## 2023-04-26 NOTE — FLOWSHEET NOTE
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, left  M23.91 (ICD-10-CM) - Patellar malalignment syndrome, right  Treatment Diagnosis: M25.561/M25.562 Bilateral knee pain  Insurance/Certification information:  PT Insurance Information: BC  Physician Information:  Rose Oneil MD  Has the plan of care been signed (Y/N):        []  Yes  [x]  No     Date of Patient follow up with Physician:       Is this a Progress Report:     []  Yes  [x]  No        If Yes:  Date Range for reporting period:  Beginning   Ending    Progress report will be due (10 Rx or 30 days whichever is less):        Recertification will be due (POC Duration  / 90 days whichever is less):          Visit # Insurance Allowable Auth Required   2   5 approved - [x]  Yes []  No        Functional Scale: LEFS 69/80    Date assessed:         Latex Allergy:  [x]NO      []YES  Preferred Language for Healthcare:   [x]English       []other:    Pain level:  1-2/10     SUBJECTIVE:  See eval    OBJECTIVE: See eval  Observation:     RESTRICTIONS/PRECAUTIONS:     Exercises/Interventions:       BIKE 5' warm up (Added )    Exercise/Equipment Resistance/Repetitions Other comments   Stretching     Hamstring     Towel Pull     Inclined Calf 3x30\"  Added    Hip Flexion     piriformis 3x30\"  Added    Groin     Quad - prone 3x30\"          PPT hook lying  10x10\"          SLR     Supine 3x10 1# Added    Abduction 3x10 1# Added    Adduction     Prone     SLR+          Isometrics     Quad sets          Patellar Glides     Medial

## 2023-04-28 ENCOUNTER — HOSPITAL ENCOUNTER (OUTPATIENT)
Dept: PHYSICAL THERAPY | Age: 58
Setting detail: THERAPIES SERIES
Discharge: HOME | End: 2023-04-28
Payer: COMMERCIAL

## 2023-05-02 ENCOUNTER — HOSPITAL ENCOUNTER (OUTPATIENT)
Dept: PHYSICAL THERAPY | Age: 58
Setting detail: THERAPIES SERIES
Discharge: HOME | End: 2023-05-02
Payer: COMMERCIAL

## 2023-05-02 PROCEDURE — 97112 NEUROMUSCULAR REEDUCATION: CPT

## 2023-05-02 PROCEDURE — 97110 THERAPEUTIC EXERCISES: CPT

## 2023-05-02 NOTE — FLOWSHEET NOTE
[address] [id number] [address]  Phone: [phone]   Fax:     [phone]    Physical Therapy Daily Treatment Note  Date:  2023    Patient Name:  Lizett Gutierrez    :  1965  MRN: [id number]  Restrictions/Precautions:    Medical/Treatment Diagnosis Information:  Diagnosis: M65.20 (ICD-10-CM) - Calcific tendinitis  Treatment Diagnosis: M25.511 Right shoulder pain  Insurance/Certification information:  [facility]  Physician Information:  Dr. Whit Tsang  Has the plan of care been signed (Y/N):        [x]  Yes  []  No     Date of Patient follow up with Physician:       Is this a Progress Report:     []  Yes  [x]  No        If Yes:  Date Range for reporting period:  Beginning 3/22  Ending    Progress report will be due (10 Rx or 30 days whichever is less):        Recertification will be due (POC Duration  / 90 days whichever is less):          Visit # Insurance Allowable Auth Required   3  4/3 6 visits 3/22- [x]  Yes []  No        Functional Scale: UEFS 65/80    Date assessed:  3/22     Latex Allergy:  [x]NO      []YES  Preferred Language for Healthcare:   [x]English       []other:    Pain level:  5/2 1/10     SUBJECTIVE:   Pt states that she hasn't  been as diligent with the exercises as she would like to but gets to a point when moving her shoulder where \" it catches and than is painful\"    OBJECTIVE:   Observation:   Test measurements:    PROM ER @ 90 deg  75 deg  PROM IR @ 90 deg  53 deg    RESTRICTIONS/PRECAUTIONS:     Exercises/Interventions:     Exercise/Equipment Resistance/Repetitions Other comments   Stretching/PROM     Wand     Table Slides 10x10\" flex    UE Wilmar flexion ER at 50 deg supine 10x10\"  ^ Clare Putt 10x10\" flex Added 4/3   Sleeper  10x10\" side lying  Added 3/29        Isometrics     Retraction          Weight shift     Flexion     Abduction     External Rotation

## 2023-05-15 ENCOUNTER — APPOINTMENT (OUTPATIENT)
Dept: PHYSICAL THERAPY | Age: 58
End: 2023-05-15
Payer: COMMERCIAL

## 2023-05-17 ENCOUNTER — HOSPITAL ENCOUNTER (OUTPATIENT)
Dept: PHYSICAL THERAPY | Age: 58
Setting detail: THERAPIES SERIES
Discharge: HOME | End: 2023-05-17
Payer: COMMERCIAL

## 2023-05-17 PROCEDURE — 97112 NEUROMUSCULAR REEDUCATION: CPT | Performed by: PHYSICAL THERAPIST

## 2023-05-17 PROCEDURE — 97140 MANUAL THERAPY 1/> REGIONS: CPT | Performed by: PHYSICAL THERAPIST

## 2023-05-17 PROCEDURE — 97110 THERAPEUTIC EXERCISES: CPT | Performed by: PHYSICAL THERAPIST

## 2023-05-17 NOTE — THERAPY RECERTIFICATION
Patient stated goal: Build strength and get rid of pain     [] Progressing: [] Met: [] Not Met: [] Adjusted     Therapist goals for Patient:   Short Term Goals: To be achieved in: 2 weeks  1. Independent in HEP and progression per patient tolerance, in order to prevent re-injury. [x] Progressing: [] Met: [] Not Met: [] Adjusted   2. Patient will have a decrease in pain to facilitate improvement in movement, function, and ADLs as indicated by Functional Deficits. [x] Progressing: [] Met: [] Not Met: [] Adjusted     Long Term Goals: To be achieved in: 8 weeks  1. Disability index score of 5% or less for the UEFS to assist with reaching prior level of function. [x] Progressing: [] Met: [] Not Met: [] Adjusted  2. Patient will demonstrate increased AROM to WNL to allow for proper joint functioning as indicated by patients Functional Deficits. [x] Progressing: [] Met: [] Not Met: [] Adjusted  3. Patient will demonstrate an increase in strength to good scapular and core control  for UE MMT at least 4+/5 to allow for proper functional mobility as indicated by patients Functional Deficits. [x] Progressing: [] Met: [] Not Met: [] Adjusted  4. Patient will return to [place] and reaching behind the back  functional activities without increased symptoms or restriction. [x] Progressing: [] Met: [] Not Met: [] Adjusted  5. Patient will be able to sleep through the night without increased symptoms or restriction. [x] Progressing: [] Met: [] Not Met: [] Adjusted                 Overall Progression Towards Functional goals/ Treatment Progress Update:  [] Patient is progressing as expected towards functional goals listed. [x] Progression is slowed due to complexities/Impairments listed. Only doing HEP 2-3 times per week; discussed increased focus on HEP will help improve outcomes 5/17  [] Progression has been slowed due to co-morbidities.   [] Plan just implemented, too soon to assess goals progression <30days   []

## 2023-05-19 ENCOUNTER — HOSPITAL ENCOUNTER (OUTPATIENT)
Dept: PHYSICAL THERAPY | Age: 58
Setting detail: THERAPIES SERIES
Discharge: HOME | End: 2023-05-19
Payer: COMMERCIAL

## 2023-05-19 PROCEDURE — 97110 THERAPEUTIC EXERCISES: CPT | Performed by: PHYSICAL THERAPIST

## 2023-05-19 PROCEDURE — 97140 MANUAL THERAPY 1/> REGIONS: CPT | Performed by: PHYSICAL THERAPIST

## 2023-05-19 NOTE — THERAPY RECERTIFICATION
Shauna Pimentel Patient Age: 61 year old  MESSAGE:   Received call from patient requesting a call back with MRI Results.      WEIGHT AND HEIGHT:   Wt Readings from Last 1 Encounters:   06/19/19 74.1 kg (163 lb 5.8 oz)     Ht Readings from Last 1 Encounters:   06/19/19 5' 7\" (1.702 m)     BMI Readings from Last 1 Encounters:   06/19/19 25.59 kg/m²       ALLERGIES: no known allergies.  Current Outpatient Medications   Medication   • olmesartan (BENICAR) 20 MG tablet     No current facility-administered medications for this visit.      PHARMACY to use:           Pharmacy preference(s) on file:   [id number] - [place], [place] - [address] [address]  Phone: [phone] Fax: [phone]      CALL BACK INFO: Ok to leave response (including medical information) with family member or on answering machine  ROUTING: Patient's physician/staff        PCP: James Hagen MD         INS: Payor: [employer] ANN MARIE / Plan: [employer] - [employer] GOOD OPAL ANN MARIE / Product Type: ANN MARIE Risk   PATIENT ADDRESS:  [address] [place] [address]    
 \"Diffuse fatty replacement and edema involving the entirety of the soleus and gastrocnemius.   The peroneus brevis and FHL are also affected to a lesser extent.  Findings are most compatible   with chronic myopathy, which could be neurogenic or vasogenic in etiology\"    Essentially, all tendons in the lower leg and ankle are intact, but patient has weakness of most of the muscles demonstrated on MRI by having fatty infiltration.  This is likely due to chronic nerve damage that innervates the muscles.  Patient is best treated by neurology, currently there is no podiatric surgical solution possible for patient.  If patient condition worsens and patient has progressive weakness, he would need AFO bracing and possible ankle/subtalar joint fusions to stabilize the limb for gait.    
Patient notified. verbaliezed understanding.  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  Order for physical therapy entered and faxed directly to patient    In fax cover sheet  Instructed patient to contact his primary physician for referral for both  and physical therapy.  Fax was successful to number patient provided.    appointment was rescheduled per his request.  
Received call from  Patient calling to see if his message has been routed to Dr. Nettles for the MRI results, and if provider has review.  Advised that the message has been forwarded to provider, but that is out of office today,and returning tomorrow.  Message routed to Dr. Nettles staff for further assistance.   
Routing to Dr Jon Nettles   Patient requesting Mri results  Message below  
sets - 10 reps - 10 hold  - Seated Shoulder External Rotation AAROM with Cane and Hand in Neutral  - 2 x daily - 7 x weekly - 1 sets - 10 reps - 10 hold  - Circular Shoulder Pendulum with Table Support  - 2 x daily - 7 x weekly - 1 sets - 10 reps - 10 hold  - Shoulder External Rotation with Anchored Resistance  - 1 x daily - 7 x weekly - 3 sets - 10 reps  - Shoulder Internal Rotation with Resistance  - 1 x daily - 7 x weekly - 3 sets - 10 reps  - Shoulder extension with resistance - Neutral  - 1 x daily - 7 x weekly - 3 sets - 10 reps  - Standing Shoulder Row with Anchored Resistance  - 1 x daily - 7 x weekly - 3 sets - 10 reps     GOALS:   Patient stated goal: Build strength and get rid of pain     [] Progressing: [] Met: [] Not Met: [] Adjusted     Therapist goals for Patient:   Short Term Goals: To be achieved in: 2 weeks  1. Independent in HEP and progression per patient tolerance, in order to prevent re-injury. [x] Progressing: [] Met: [] Not Met: [] Adjusted   2. Patient will have a decrease in pain to facilitate improvement in movement, function, and ADLs as indicated by Functional Deficits. [x] Progressing: [] Met: [] Not Met: [] Adjusted     Long Term Goals: To be achieved in: 8 weeks  1. Disability index score of 5% or less for the UEFS to assist with reaching prior level of function. [x] Progressing: [] Met: [] Not Met: [] Adjusted  2. Patient will demonstrate increased AROM to WNL to allow for proper joint functioning as indicated by patients Functional Deficits. [x] Progressing: [] Met: [] Not Met: [] Adjusted  3. Patient will demonstrate an increase in strength to good scapular and core control  for UE MMT at least 4+/5 to allow for proper functional mobility as indicated by patients Functional Deficits. [x] Progressing: [] Met: [] Not Met: [] Adjusted  4. Patient will return to [place] and reaching behind the back  functional activities without increased symptoms or restriction.    [x]

## 2023-06-05 ENCOUNTER — TELEPHONE (OUTPATIENT)
Dept: PRIMARY CARE CLINIC | Age: 58
End: 2023-06-05

## 2023-06-05 NOTE — PROGRESS NOTES
Arm, Position: Sitting, Cuff Size: Medium Adult)   Pulse 63   Temp 97.2 °F (36.2 °C) (Infrared)   Wt 127 lb 12.8 oz (58 kg)   SpO2 98%   BMI 22.64 kg/m²    BP Readings from Last 3 Encounters:   06/06/23 122/80   02/08/23 108/64   02/07/23 108/62     PE:  Physical Exam  Vitals and nursing note reviewed. Constitutional:       General: She is not in acute distress. Appearance: Normal appearance. She is well-developed and normal weight. She is not diaphoretic. Neck:      Vascular: No carotid bruit. Cardiovascular:      Rate and Rhythm: Normal rate and regular rhythm. Heart sounds: Normal heart sounds. No murmur heard. No friction rub. Pulmonary:      Effort: Pulmonary effort is normal. No respiratory distress. Breath sounds: Normal breath sounds. No wheezing, rhonchi or rales. Abdominal:      General: Abdomen is flat. There is no distension. Palpations: Abdomen is soft. Skin:     General: Skin is warm and dry. Findings: No rash. Neurological:      Mental Status: She is alert and oriented to person, place, and time. Motor: No weakness. Gait: Gait normal.   Psychiatric:         Mood and Affect: Mood normal.         Behavior: Behavior normal.        CT Angio Head W and or WO   Final Result   IMPRESSION:     Head CT   1. No acute intracranial abnormality. 2. Hyperdensity at the region of the foramen of Monro is suspicious for a small cholelithiasis measuring approximately 4 mm. Normal size of the ventricles. 3. Partially empty sella, nonspecific. Neck CTA   1. No evidence of carotid or vertebral artery stenosis. 2. No dissection or pseudoaneurysm. Head CTA   1. No intracranial aneurysm, stenosis, or AV shunting. 2. No large vessel occlusion.      Hemoglobin A1C  Specimen:  Whole Blood   Ref Range & Units 12 d ago Comments   Hemoglobin A1C 4.0 - 5.6 % 5.8        Patient Active Problem List   Diagnosis    Osteoarthritis of right knee    Palpitations

## 2023-06-05 NOTE — TELEPHONE ENCOUNTER
----- Message from [facility] sent at 6/5/2023  2:41 PM EDT -----  Subject: Hospital Follow Up    QUESTIONS  What hospital was the Patient Discharged from? n/a  Date of Discharge? 2023-05-29  Discharge Location? Home  Reason for hospitalization as patient stated? dizziness  What question does the patient have, if applicable? not sure of date   release call was disconnect please call patient for hospital follow up   tried calling her back unable to speak with her  ---------------------------------------------------------------------------  --------------  [address]  What is the best way for the office to contact you? OK to leave message on   voicemail  Preferred Call Back Phone Number? [phone]  ---------------------------------------------------------------------------  --------------  SCRIPT ANSWERS  Relationship to Patient?  Self

## 2023-06-06 ENCOUNTER — OFFICE VISIT (OUTPATIENT)
Dept: PRIMARY CARE CLINIC | Age: 58
End: 2023-06-06
Payer: COMMERCIAL

## 2023-06-06 VITALS
TEMPERATURE: 97.2 F | DIASTOLIC BLOOD PRESSURE: 80 MMHG | HEART RATE: 63 BPM | WEIGHT: 127.8 LBS | SYSTOLIC BLOOD PRESSURE: 122 MMHG | BODY MASS INDEX: 22.64 KG/M2 | OXYGEN SATURATION: 98 %

## 2023-06-06 DIAGNOSIS — R73.03 PREDIABETES: ICD-10-CM

## 2023-06-06 DIAGNOSIS — D68.51 FACTOR V LEIDEN (HCC): ICD-10-CM

## 2023-06-06 DIAGNOSIS — G45.9 TIA (TRANSIENT ISCHEMIC ATTACK): Primary | ICD-10-CM

## 2023-06-06 PROCEDURE — 99214 OFFICE O/P EST MOD 30 MIN: CPT | Performed by: NURSE PRACTITIONER

## 2023-06-06 RX ORDER — ASPIRIN 81 MG/1
81 TABLET ORAL DAILY
COMMUNITY

## 2023-06-06 ASSESSMENT — ENCOUNTER SYMPTOMS
SHORTNESS OF BREATH: 0
DIARRHEA: 0
WHEEZING: 0
COUGH: 0
VOMITING: 0
NAUSEA: 0

## 2023-06-20 ENCOUNTER — HOSPITAL ENCOUNTER (OUTPATIENT)
Dept: PHYSICAL THERAPY | Age: 58
Setting detail: THERAPIES SERIES
Discharge: HOME | End: 2023-06-20

## 2023-06-20 NOTE — FLOWSHEET NOTE
Physical Therapy  Cancellation/No-show Note  Patient Name:  Nelli Zazueta  :  1965   Date:  2023  Cancelled visits to date: 0  No-shows to date:     For today's appointment patient:  []  Cancelled  []  Rescheduled appointment  [x]  No-show     Reason given by patient:  []  Patient ill  []  Conflicting appointment  []  No transportation    []  Conflict with work  []  No reason given  []  Other:     Comments:      Electronically signed by:  Teo Hernandez PT, PT

## 2023-11-13 ENCOUNTER — OFFICE VISIT (OUTPATIENT)
Dept: PRIMARY CARE CLINIC | Age: 58
End: 2023-11-13
Payer: COMMERCIAL

## 2023-11-13 VITALS
HEIGHT: 63 IN | BODY MASS INDEX: 22.68 KG/M2 | TEMPERATURE: 97.2 F | HEART RATE: 68 BPM | RESPIRATION RATE: 16 BRPM | OXYGEN SATURATION: 97 % | SYSTOLIC BLOOD PRESSURE: 102 MMHG | DIASTOLIC BLOOD PRESSURE: 82 MMHG | WEIGHT: 128 LBS

## 2023-11-13 DIAGNOSIS — R89.8 EOSINOPHILS INCREASED: ICD-10-CM

## 2023-11-13 DIAGNOSIS — H00.14 CHALAZION OF LEFT UPPER EYELID: ICD-10-CM

## 2023-11-13 DIAGNOSIS — M79.10 MYALGIA: ICD-10-CM

## 2023-11-13 DIAGNOSIS — E78.2 MIXED HYPERLIPIDEMIA: Primary | ICD-10-CM

## 2023-11-13 DIAGNOSIS — M25.50 POLYARTHRALGIA: ICD-10-CM

## 2023-11-13 DIAGNOSIS — R73.03 PREDIABETES: ICD-10-CM

## 2023-11-13 PROCEDURE — 99214 OFFICE O/P EST MOD 30 MIN: CPT | Performed by: INTERNAL MEDICINE

## 2023-11-13 RX ORDER — IBUPROFEN 600 MG/1
600 TABLET ORAL 3 TIMES DAILY PRN
Qty: 90 TABLET | Refills: 1 | Status: SHIPPED | OUTPATIENT
Start: 2023-11-13

## 2023-11-13 NOTE — PATIENT INSTRUCTIONS
Stop Rosuvastatin     Follow up with [facility] regarding possible chalazion [phone]    Fast 8-10 hours for labs    Low fat, low cholesterol diet  Low carbohydrate diet  Regular aerobic exercise

## 2023-11-13 NOTE — PROGRESS NOTES
Date of Visit: 2023    Jojo Mckeon (:  1965) is a 66 y.o. female,  Established patient here for evaluation of the following chief complaint(s):  Follow-up, Discuss Medications (Go over labs too), and Other (Has a bump on left eyelid)      ASSESSMENT/PLAN:    1. Mixed hyperlipidemia  -Stop Rosuvastatin due to arthralgias and myalgias  -Low fat, low cholesterol diet  -Regular aerobic exercise  - Lipid Panel; Future  -Comprehensive Metabolic Panel; Future    2. Polyarthralgia  - ibuprofen (ADVIL;MOTRIN) 600 MG tablet; Take 1 tablet by mouth 3 times daily as needed for Pain  Dispense: 90 tablet; Refill: 1  -Stop Rosuvastatin due to arthralgias and myalgias  - Sedimentation Rate; Future  -RHEUMATOID FACTOR; Future  - RIMMA; Future  - Uric Acid; Future    3. Myalgia  - ibuprofen (ADVIL;MOTRIN) 600 MG tablet; Take 1 tablet by mouth 3 times daily as needed for Pain  Dispense: 90 tablet; Refill: 1  -Stop Rosuvastatin due to arthralgias and myalgias  -CK; Future  -Magnesium; Future  -Sedimentation Rate; Future    4. Prediabetes  -Low carbohydrate diet  -Regular aerobic exercise  - Hemoglobin A1C; Future     5. Chalazion of left upper eyelid  -Follow up with [facility] regarding possible chalazion [phone]    6. Eosinophils increased  -CBC with Auto Differential; Future             Return in about 3 weeks (around 2023) for myalgia, polyarthralgia, and hyperlipidemia. SUBJECTIVE:    Patient states she was in the hospital 23 for TIA. Patient states her HDL was lower than it used to be. Patient states she has a bit more muscle soreness and joint pain the last month or so. Patient has pain knees but has it periodically, shoulders, hands. Muscle pain shins and feet. Patient denies joint swelling. Pain dull achy gets stiffness. Patient feels like a dago horse wants to come on. Patient states she has never really had dago horses. Patient has hyperlipidemia.   Patient states she has been on

## 2023-11-17 ENCOUNTER — HOSPITAL ENCOUNTER (OUTPATIENT)
Age: 58
Discharge: HOME | End: 2023-11-17
Payer: COMMERCIAL

## 2023-11-17 DIAGNOSIS — R73.03 PREDIABETES: ICD-10-CM

## 2023-11-17 DIAGNOSIS — E78.2 MIXED HYPERLIPIDEMIA: ICD-10-CM

## 2023-11-17 DIAGNOSIS — M25.50 POLYARTHRALGIA: ICD-10-CM

## 2023-11-17 DIAGNOSIS — M79.10 MYALGIA: ICD-10-CM

## 2023-11-17 DIAGNOSIS — R89.8 EOSINOPHILS INCREASED: ICD-10-CM

## 2023-11-17 LAB
ALBUMIN SERPL-MCNC: 4.3 G/DL (ref 3.4–5)
ALBUMIN/GLOB SERPL: 1.5 {RATIO} (ref 1.1–2.2)
ALP SERPL-CCNC: 68 U/L (ref 40–129)
ALT SERPL-CCNC: 11 U/L (ref 10–40)
ANION GAP SERPL CALCULATED.3IONS-SCNC: 9 MMOL/L (ref 3–16)
AST SERPL-CCNC: 17 U/L (ref 15–37)
BASOPHILS # BLD: 0 K/UL (ref 0–0.2)
BASOPHILS NFR BLD: 0.4 %
BILIRUB SERPL-MCNC: 0.4 MG/DL (ref 0–1)
BUN SERPL-MCNC: 15 MG/DL (ref 7–20)
CALCIUM SERPL-MCNC: 9.6 MG/DL (ref 8.3–10.6)
CHLORIDE SERPL-SCNC: 103 MMOL/L (ref 99–110)
CHOLEST SERPL-MCNC: 181 MG/DL (ref 0–199)
CK SERPL-CCNC: 118 U/L (ref 26–192)
CO2 SERPL-SCNC: 29 MMOL/L (ref 21–32)
CREAT SERPL-MCNC: 0.8 MG/DL (ref 0.6–1.1)
DEPRECATED RDW RBC AUTO: 15 % (ref 12.4–15.4)
EOSINOPHIL # BLD: 0.4 K/UL (ref 0–0.6)
EOSINOPHIL NFR BLD: 5.5 %
ERYTHROCYTE [SEDIMENTATION RATE] IN BLOOD BY WESTERGREN METHOD: 15 MM/HR (ref 0–30)
GFR SERPLBLD CREATININE-BSD FMLA CKD-EPI: >60 ML/MIN/{1.73_M2}
GLUCOSE SERPL-MCNC: 87 MG/DL (ref 70–99)
HCT VFR BLD AUTO: 38.3 % (ref 36–48)
HDLC SERPL-MCNC: 71 MG/DL (ref 40–60)
HGB BLD-MCNC: 12.7 G/DL (ref 12–16)
LDLC SERPL CALC-MCNC: 103 MG/DL
LYMPHOCYTES # BLD: 2 K/UL (ref 1–5.1)
LYMPHOCYTES NFR BLD: 29.7 %
MAGNESIUM SERPL-MCNC: 2 MG/DL (ref 1.8–2.4)
MCH RBC QN AUTO: 23.4 PG (ref 26–34)
MCHC RBC AUTO-ENTMCNC: 33.1 G/DL (ref 31–36)
MCV RBC AUTO: 70.7 FL (ref 80–100)
MONOCYTES # BLD: 0.5 K/UL (ref 0–1.3)
MONOCYTES NFR BLD: 8 %
NEUTROPHILS # BLD: 3.8 K/UL (ref 1.7–7.7)
NEUTROPHILS NFR BLD: 56.4 %
PLATELET # BLD AUTO: 249 K/UL (ref 135–450)
PMV BLD AUTO: 10 FL (ref 5–10.5)
POTASSIUM SERPL-SCNC: 4.3 MMOL/L (ref 3.5–5.1)
PROT SERPL-MCNC: 7.2 G/DL (ref 6.4–8.2)
RBC # BLD AUTO: 5.42 M/UL (ref 4–5.2)
RHEUMATOID FACT SER IA-ACNC: <10 IU/ML
SODIUM SERPL-SCNC: 141 MMOL/L (ref 136–145)
TRIGL SERPL-MCNC: 35 MG/DL (ref 0–150)
URATE SERPL-MCNC: 5.3 MG/DL (ref 2.6–6)
VLDLC SERPL CALC-MCNC: 7 MG/DL
WBC # BLD AUTO: 6.8 K/UL (ref 4–11)

## 2023-11-17 PROCEDURE — 80061 LIPID PANEL: CPT

## 2023-11-17 PROCEDURE — 84550 ASSAY OF BLOOD/URIC ACID: CPT

## 2023-11-17 PROCEDURE — 85652 RBC SED RATE AUTOMATED: CPT

## 2023-11-17 PROCEDURE — 85025 COMPLETE CBC W/AUTO DIFF WBC: CPT

## 2023-11-17 PROCEDURE — 80053 COMPREHEN METABOLIC PANEL: CPT

## 2023-11-17 PROCEDURE — 82550 ASSAY OF CK (CPK): CPT

## 2023-11-17 PROCEDURE — 86039 ANTINUCLEAR ANTIBODIES (ANA): CPT

## 2023-11-17 PROCEDURE — 86038 ANTINUCLEAR ANTIBODIES: CPT

## 2023-11-17 PROCEDURE — 36415 COLL VENOUS BLD VENIPUNCTURE: CPT

## 2023-11-17 PROCEDURE — 83036 HEMOGLOBIN GLYCOSYLATED A1C: CPT

## 2023-11-17 PROCEDURE — 83735 ASSAY OF MAGNESIUM: CPT

## 2023-11-17 PROCEDURE — 86431 RHEUMATOID FACTOR QUANT: CPT

## 2023-11-18 LAB
ANA SER QL IA: POSITIVE
EST. AVERAGE GLUCOSE BLD GHB EST-MCNC: 128.4 MG/DL
HBA1C MFR BLD: 6.1 %

## 2023-11-19 LAB
ANA PATTERN: ABNORMAL
ANA TITER: ABNORMAL
ANTINUCLEAR AB INTERPRETIVE COMMENT: ABNORMAL
NUCLEAR IGG SER QL IF: DETECTED

## 2023-11-27 DIAGNOSIS — M79.10 MYALGIA: ICD-10-CM

## 2023-11-27 DIAGNOSIS — M25.50 POLYARTHRALGIA: Primary | ICD-10-CM

## 2023-11-27 DIAGNOSIS — R76.8 POSITIVE ANA (ANTINUCLEAR ANTIBODY): ICD-10-CM

## 2023-11-27 PROBLEM — R89.8 EOSINOPHILS INCREASED: Status: ACTIVE | Noted: 2023-11-27

## 2023-11-27 PROBLEM — H00.14 CHALAZION OF LEFT UPPER EYELID: Status: ACTIVE | Noted: 2023-11-27

## 2023-11-27 ASSESSMENT — ENCOUNTER SYMPTOMS
SHORTNESS OF BREATH: 0
RHINORRHEA: 0
BLOOD IN STOOL: 0
VOMITING: 0
SORE THROAT: 0
SINUS PRESSURE: 0
CHEST TIGHTNESS: 0
NAUSEA: 0
ABDOMINAL PAIN: 0
COUGH: 0
WHEEZING: 0
DIARRHEA: 0
CONSTIPATION: 0

## 2023-12-04 LAB — PAP SMEAR, EXTERNAL: NEGATIVE

## 2024-02-26 ENCOUNTER — TELEPHONE (OUTPATIENT)
Dept: CARDIOLOGY CLINIC | Age: 59
End: 2024-02-26

## 2024-02-26 DIAGNOSIS — E78.2 MIXED HYPERLIPIDEMIA: Primary | ICD-10-CM

## 2024-02-26 RX ORDER — ROSUVASTATIN CALCIUM 40 MG/1
40 TABLET, COATED ORAL DAILY
Qty: 90 TABLET | Refills: 4 | Status: SHIPPED | OUTPATIENT
Start: 2024-02-26

## 2024-02-26 RX ORDER — ROSUVASTATIN CALCIUM 40 MG/1
40 TABLET, COATED ORAL DAILY
Qty: 90 TABLET | Refills: 4 | Status: SHIPPED | OUTPATIENT
Start: 2024-02-26 | End: 2024-02-26 | Stop reason: SDUPTHER

## 2024-02-26 NOTE — TELEPHONE ENCOUNTER
Pt called an is scheduled for appt with [facility] 2/28 however they are out of the rosuvastatin (CRESTOR) 40 MG tablet, would it be possible to call RX in to:    [id number] - [facility] [address] - P [phone] - F [phone] [address] [facility] [address]  Phone: [phone]  Fax: [phone]

## 2024-02-28 ENCOUNTER — OFFICE VISIT (OUTPATIENT)
Dept: CARDIOLOGY CLINIC | Age: 59
End: 2024-02-28
Payer: COMMERCIAL

## 2024-02-28 VITALS
HEART RATE: 78 BPM | SYSTOLIC BLOOD PRESSURE: 126 MMHG | HEIGHT: 63 IN | DIASTOLIC BLOOD PRESSURE: 64 MMHG | WEIGHT: 127 LBS | OXYGEN SATURATION: 99 % | BODY MASS INDEX: 22.5 KG/M2

## 2024-02-28 DIAGNOSIS — R00.2 PALPITATIONS: ICD-10-CM

## 2024-02-28 DIAGNOSIS — Z82.49 FAMILY HISTORY OF HEART DISEASE: ICD-10-CM

## 2024-02-28 DIAGNOSIS — E78.2 MIXED HYPERLIPIDEMIA: Primary | ICD-10-CM

## 2024-02-28 PROCEDURE — 99214 OFFICE O/P EST MOD 30 MIN: CPT | Performed by: INTERNAL MEDICINE

## 2024-02-28 NOTE — PATIENT INSTRUCTIONS
Follow up with Dr Mccormick in 1 year     Recommend repeating cardiac calcium score in 3-5 years and make sure that it is staying low.     Recommend continuing your aspirin daily.     Call for any questions or concerns.

## 2024-02-28 NOTE — PROGRESS NOTES
Diabetes in her maternal grandmother and mother; Heart Attack in her father, maternal grandmother, and paternal uncle; Hypertension in her mother; Osteoporosis in her mother; Other in her father and maternal grandmother.     Allergies:  Sulfa antibiotics     Home Medications:  Prior to Visit Medications    Medication Sig Taking? Authorizing Provider   Biotin 5000 MCG CAPS Take by mouth Yes Tracy Anthony MD   rosuvastatin (CRESTOR) 40 MG tablet Take 1 tablet by mouth daily Yes Alejandro Mccormick MD   ibuprofen (ADVIL;MOTRIN) 600 MG tablet Take 1 tablet by mouth 3 times daily as needed for Pain Yes Kin Leon MD   aspirin 81 MG EC tablet Take 1 tablet by mouth daily Yes Tracy Anthony MD   fluticasone (FLONASE) 50 MCG/ACT nasal spray 1 spray by Nasal route daily Yes Anderson Orta MD   Multiple Vitamins-Minerals (THERAPEUTIC MULTIVITAMIN-MINERALS) tablet Take 1 tablet by mouth daily Yes ProviderTracy MD       [x] Medications and dosages reviewed.    ROS:  [x]Full ROS obtained and negative except as mentioned in HPI      PHYSICAL EXAMINATION:  Vitals:    02/28/24 1408   BP: 126/64   Site: Left Upper Arm   Position: Sitting   Cuff Size: Medium Adult   Pulse: 78   SpO2: 99%   Weight: 57.6 kg (127 lb)   Height: 1.6 m (5' 2.99\")        GENERAL: Well developed, well nourished, No acute distress  NEUROLOGICAL: Alert and oriented  PSYCH: Calm affect  SKIN: Warm and dry, No visible rash,   EYES: Pupils equal and round, Sclera non-icteric,   HENT:  External ears and nose unremarkable, mucus membranes moist  MUSCULOSKELETAL: Normal cephalic, neck supple  CAROTID: Normal upstroke, no bruits  CARDIAC: JVP normal, Normal PMI, regular rate and rhythm, normal S1S2, no murmur, rub, or gallop  RESPIRATORY: Normal respiratory effort, clear to auscultation bilaterally  EXTREMITIES: No edema  GASTROINTESTINAL: normal bowel sounds, soft, non-tender, No hepatomegaly     MYOVIEW 12/2022  Summary    Normal

## 2024-11-18 ENCOUNTER — TELEPHONE (OUTPATIENT)
Dept: PRIMARY CARE CLINIC | Age: 59
End: 2024-11-18

## 2024-11-18 DIAGNOSIS — R73.03 PREDIABETES: Primary | ICD-10-CM

## 2024-11-18 DIAGNOSIS — Z00.00 ANNUAL PHYSICAL EXAM: ICD-10-CM

## 2024-11-18 NOTE — TELEPHONE ENCOUNTER
----- Message from Tulio BELTRÁN sent at 11/18/2024  9:15 AM EST -----  Regarding: [facility] Referral Request  [facility] Referral Request    Reason for referral request: Lab/Test Order    Specialist/Lab/Test patient is requesting (if known):    Specialist Phone Number (if applicable):    Additional Information: Patient just wanted to know whether she will be needing blood work prior to her scheduled appointment on 01/02/2024 with PCP, Kin Leon MD and if so, she'd like to request an order for her to be able to schedule her blood work a day prior to her appointment.  --------------------------------------------------------------------------------------------------------------------------    Relationship to Patient: Self     Call Back Information: OK to leave message on voicemail  Preferred Call Back Number: Phone [phone]

## 2024-11-21 NOTE — TELEPHONE ENCOUNTER
Call patient. Lab orders are in the computer. Fast 8-10 hours prior to the labs. It is ok to drink water.

## 2024-12-30 ENCOUNTER — HOSPITAL ENCOUNTER (OUTPATIENT)
Age: 59
Discharge: HOME | End: 2024-12-30
Payer: COMMERCIAL

## 2024-12-30 DIAGNOSIS — R73.03 PREDIABETES: ICD-10-CM

## 2024-12-30 DIAGNOSIS — Z00.00 ANNUAL PHYSICAL EXAM: ICD-10-CM

## 2024-12-30 LAB
ALBUMIN SERPL-MCNC: 4.3 G/DL (ref 3.4–5)
ALBUMIN/GLOB SERPL: 1.6 {RATIO} (ref 1.1–2.2)
ALP SERPL-CCNC: 62 U/L (ref 40–129)
ALT SERPL-CCNC: 15 U/L (ref 10–40)
ANION GAP SERPL CALCULATED.3IONS-SCNC: 9 MMOL/L (ref 3–16)
AST SERPL-CCNC: 18 U/L (ref 15–37)
BASOPHILS # BLD: 0.1 K/UL (ref 0–0.2)
BASOPHILS NFR BLD: 0.8 %
BILIRUB SERPL-MCNC: 0.3 MG/DL (ref 0–1)
BUN SERPL-MCNC: 16 MG/DL (ref 7–20)
CALCIUM SERPL-MCNC: 10 MG/DL (ref 8.3–10.6)
CHLORIDE SERPL-SCNC: 105 MMOL/L (ref 99–110)
CHOLEST SERPL-MCNC: 200 MG/DL (ref 0–199)
CO2 SERPL-SCNC: 27 MMOL/L (ref 21–32)
CREAT SERPL-MCNC: 0.9 MG/DL (ref 0.6–1.1)
DEPRECATED RDW RBC AUTO: 14.8 % (ref 12.4–15.4)
EOSINOPHIL # BLD: 0.5 K/UL (ref 0–0.6)
EOSINOPHIL NFR BLD: 7.6 %
GFR SERPLBLD CREATININE-BSD FMLA CKD-EPI: 74 ML/MIN/{1.73_M2}
GLUCOSE SERPL-MCNC: 83 MG/DL (ref 70–99)
HCT VFR BLD AUTO: 38.7 % (ref 36–48)
HDLC SERPL-MCNC: 74 MG/DL (ref 40–60)
HGB BLD-MCNC: 13 G/DL (ref 12–16)
LDLC SERPL CALC-MCNC: 116 MG/DL
LYMPHOCYTES # BLD: 1.9 K/UL (ref 1–5.1)
LYMPHOCYTES NFR BLD: 26.3 %
MCH RBC QN AUTO: 24.1 PG (ref 26–34)
MCHC RBC AUTO-ENTMCNC: 33.7 G/DL (ref 31–36)
MCV RBC AUTO: 71.4 FL (ref 80–100)
MONOCYTES # BLD: 0.5 K/UL (ref 0–1.3)
MONOCYTES NFR BLD: 7.5 %
NEUTROPHILS # BLD: 4.1 K/UL (ref 1.7–7.7)
NEUTROPHILS NFR BLD: 57.8 %
PLATELET # BLD AUTO: 216 K/UL (ref 135–450)
PMV BLD AUTO: 9.7 FL (ref 5–10.5)
POTASSIUM SERPL-SCNC: 4.2 MMOL/L (ref 3.5–5.1)
PROT SERPL-MCNC: 7 G/DL (ref 6.4–8.2)
RBC # BLD AUTO: 5.41 M/UL (ref 4–5.2)
SODIUM SERPL-SCNC: 141 MMOL/L (ref 136–145)
TRIGL SERPL-MCNC: 49 MG/DL (ref 0–150)
TSH SERPL DL<=0.005 MIU/L-ACNC: 1.98 UIU/ML (ref 0.27–4.2)
VLDLC SERPL CALC-MCNC: 10 MG/DL
WBC # BLD AUTO: 7 K/UL (ref 4–11)

## 2024-12-30 PROCEDURE — 80061 LIPID PANEL: CPT

## 2024-12-30 PROCEDURE — 80053 COMPREHEN METABOLIC PANEL: CPT

## 2024-12-30 PROCEDURE — 83036 HEMOGLOBIN GLYCOSYLATED A1C: CPT

## 2024-12-30 PROCEDURE — 84443 ASSAY THYROID STIM HORMONE: CPT

## 2024-12-30 PROCEDURE — 85025 COMPLETE CBC W/AUTO DIFF WBC: CPT

## 2024-12-30 PROCEDURE — 36415 COLL VENOUS BLD VENIPUNCTURE: CPT

## 2024-12-31 LAB
EST. AVERAGE GLUCOSE BLD GHB EST-MCNC: 119.8 MG/DL
HBA1C MFR BLD: 5.8 %

## 2025-01-02 ENCOUNTER — HOSPITAL ENCOUNTER (OUTPATIENT)
Dept: GENERAL RADIOLOGY | Age: 60
Discharge: HOME | End: 2025-01-02
Payer: COMMERCIAL

## 2025-01-02 ENCOUNTER — OFFICE VISIT (OUTPATIENT)
Dept: PRIMARY CARE CLINIC | Age: 60
End: 2025-01-02
Payer: COMMERCIAL

## 2025-01-02 VITALS
HEIGHT: 63 IN | HEART RATE: 61 BPM | BODY MASS INDEX: 22.86 KG/M2 | OXYGEN SATURATION: 96 % | WEIGHT: 129 LBS | SYSTOLIC BLOOD PRESSURE: 124 MMHG | DIASTOLIC BLOOD PRESSURE: 86 MMHG | RESPIRATION RATE: 16 BRPM | TEMPERATURE: 96.9 F

## 2025-01-02 DIAGNOSIS — R20.0 NUMBNESS AND TINGLING OF BOTH LOWER EXTREMITIES: ICD-10-CM

## 2025-01-02 DIAGNOSIS — Z78.0 POSTMENOPAUSE: ICD-10-CM

## 2025-01-02 DIAGNOSIS — R71.8 LOW MEAN CORPUSCULAR VOLUME (MCV): ICD-10-CM

## 2025-01-02 DIAGNOSIS — M54.50 LOW BACK PAIN, UNSPECIFIED BACK PAIN LATERALITY, UNSPECIFIED CHRONICITY, UNSPECIFIED WHETHER SCIATICA PRESENT: ICD-10-CM

## 2025-01-02 DIAGNOSIS — D68.51 FACTOR V LEIDEN (HCC): ICD-10-CM

## 2025-01-02 DIAGNOSIS — E78.2 MIXED HYPERLIPIDEMIA: ICD-10-CM

## 2025-01-02 DIAGNOSIS — Z00.00 ENCOUNTER FOR WELL ADULT EXAM WITHOUT ABNORMAL FINDINGS: Primary | ICD-10-CM

## 2025-01-02 DIAGNOSIS — K76.89 LIVER NODULE: ICD-10-CM

## 2025-01-02 DIAGNOSIS — R73.03 PREDIABETES: ICD-10-CM

## 2025-01-02 DIAGNOSIS — R71.8 ELEVATED RED BLOOD CELL COUNT: ICD-10-CM

## 2025-01-02 DIAGNOSIS — H61.23 BILATERAL IMPACTED CERUMEN: ICD-10-CM

## 2025-01-02 DIAGNOSIS — R20.2 NUMBNESS AND TINGLING OF BOTH LOWER EXTREMITIES: ICD-10-CM

## 2025-01-02 LAB
25(OH)D3 SERPL-MCNC: 48.9 NG/ML
IRON SATN MFR SERPL: 27 % (ref 15–50)
IRON SERPL-MCNC: 66 UG/DL (ref 37–145)
TIBC SERPL-MCNC: 248 UG/DL (ref 260–445)

## 2025-01-02 PROCEDURE — 99396 PREV VISIT EST AGE 40-64: CPT | Performed by: INTERNAL MEDICINE

## 2025-01-02 PROCEDURE — 72100 X-RAY EXAM L-S SPINE 2/3 VWS: CPT

## 2025-01-02 RX ORDER — IBUPROFEN 600 MG/1
600 TABLET, FILM COATED ORAL 3 TIMES DAILY PRN
Qty: 90 TABLET | Refills: 1 | Status: SHIPPED | OUTPATIENT
Start: 2025-01-02

## 2025-01-02 SDOH — ECONOMIC STABILITY: FOOD INSECURITY: WITHIN THE PAST 12 MONTHS, THE FOOD YOU BOUGHT JUST DIDN'T LAST AND YOU DIDN'T HAVE MONEY TO GET MORE.: NEVER TRUE

## 2025-01-02 SDOH — ECONOMIC STABILITY: FOOD INSECURITY: WITHIN THE PAST 12 MONTHS, YOU WORRIED THAT YOUR FOOD WOULD RUN OUT BEFORE YOU GOT MONEY TO BUY MORE.: NEVER TRUE

## 2025-01-02 SDOH — ECONOMIC STABILITY: INCOME INSECURITY: HOW HARD IS IT FOR YOU TO PAY FOR THE VERY BASICS LIKE FOOD, HOUSING, MEDICAL CARE, AND HEATING?: NOT HARD AT ALL

## 2025-01-02 ASSESSMENT — PATIENT HEALTH QUESTIONNAIRE - PHQ9
2. FEELING DOWN, DEPRESSED OR HOPELESS: NOT AT ALL
SUM OF ALL RESPONSES TO PHQ QUESTIONS 1-9: 0
SUM OF ALL RESPONSES TO PHQ QUESTIONS 1-9: 0
SUM OF ALL RESPONSES TO PHQ9 QUESTIONS 1 & 2: 0
SUM OF ALL RESPONSES TO PHQ QUESTIONS 1-9: 0
1. LITTLE INTEREST OR PLEASURE IN DOING THINGS: NOT AT ALL
SUM OF ALL RESPONSES TO PHQ QUESTIONS 1-9: 0

## 2025-01-02 NOTE — PROGRESS NOTES
Well Adult Note  Name: Elena Orellana Today’s Date: 2025   MRN: [id number] Sex: Female   Age: 59 y.o. Ethnicity: Non- / Non    : 1965 Race: Black /       Elena Orellana is here for a well adult exam.       Assessment & Plan   1. Encounter for well adult exam without abnormal findings  Assessment & Plan:  -Comprehensive metabolic panel, CBC with differential, TSH, and lipid panel  -Mammogram done on 2024  -Pap smear is scheduled for the end   -Colonoscopy done on 22 by Dr. Arnaud Odonnell  2. Mixed hyperlipidemia  Assessment & Plan:  -not controlled  -Increase rosuvastatin 40 mg to every day and if not tolerated go back to every other day  -Try Mediterranean diet  -low cholesterol, low fat diet  -regular aerobic exercise  3. Prediabetes  Assessment & Plan:  -stable   -Low carbohydrate diet  -Regular aerobic exercise  4. Low back pain, unspecified back pain laterality, unspecified chronicity, unspecified whether sciatica present  Assessment & Plan:  -lumbar spine xray  -Ibuprofen 600mg 3 times daily as needed  -low back exercises  -Referral to Orthopedics Spine  Orders:  -     XR LUMBAR SPINE (2-3 VIEWS); Future  -     Yessi Real PA, Orthopedic Surgery (Spine), [facility]  -     ibuprofen (ADVIL;MOTRIN) 600 MG tablet; Take 1 tablet by mouth 3 times daily as needed for Pain, Disp-90 tablet, R-1Normal  5. Numbness and tingling of both lower extremities  Assessment & Plan:  -EMG  -Referral to Orthopedics Spine   Orders:  -     Anderson Berry MD (Inpatient and Outpatient EMG), [facility]  -     Yessi Real PA, Orthopedic Surgery (Spine), [facility]  6. Factor V Leiden (HCC)  Assessment & Plan:  -Referral to Hematology  Orders:  -     [facility] - En Rhoades MD, Hematology/Oncology, [facility]  7. Postmenopause  Assessment & Plan:  -Vitamin D 25 Hydroxy  Orders:  -     Vitamin D 25

## 2025-01-02 NOTE — PATIENT INSTRUCTIONS
-start Coenzyme Q-10 100mg once daily  -Regular aerobic exercise to a goal of 150 minutes per week             Well Visit, Ages 18 to 65: Care Instructions  Well visits can help you stay healthy. Your doctor has checked your overall health and may have suggested ways to take good care of yourself. Your doctor also may have recommended tests. You can help prevent illness with healthy eating, good sleep, vaccinations, regular exercise, and other steps.    Get the tests that you and your doctor decide on. Depending on your age and risks, examples might include screening for diabetes; hepatitis C; HIV; and cervical, breast, lung, and colon cancer. Screening helps find diseases before any symptoms appear.   Eat healthy foods. Choose fruits, vegetables, whole grains, lean protein, and low-fat dairy foods. Limit saturated fat and reduce salt.     Limit alcohol. Men should have no more than 2 drinks a day. Women should have no more than 1. For some people, no alcohol is the best choice.   Exercise. Get at least 30 minutes of exercise on most days of the week. Walking can be a good choice.     Reach and stay at your healthy weight. This will lower your risk for many health problems.   Take care of your mental health. Try to stay connected with friends, family, and community, and find ways to manage stress.     If you're feeling depressed or hopeless, talk to someone. A counselor can help. If you don't have a counselor, talk to your doctor.   Talk to your doctor if you think you may have a problem with alcohol or drug use. This includes prescription medicines, marijuana, and other drugs.     Avoid tobacco and nicotine: Don't smoke, vape, or chew. If you need help quitting, talk to your doctor.   Practice safer sex. Getting tested, using condoms or dental dams, and limiting sex partners can help prevent STIs.     Use birth control if it's important to you to prevent pregnancy. Talk with your doctor about your choices and what

## 2025-01-07 ENCOUNTER — OFFICE VISIT (OUTPATIENT)
Dept: ORTHOPEDIC SURGERY | Age: 60
End: 2025-01-07
Payer: COMMERCIAL

## 2025-01-07 VITALS — BODY MASS INDEX: 22.86 KG/M2 | WEIGHT: 129 LBS | HEIGHT: 63 IN

## 2025-01-07 DIAGNOSIS — M47.816 SPONDYLOSIS WITHOUT MYELOPATHY OR RADICULOPATHY, LUMBAR REGION: Primary | ICD-10-CM

## 2025-01-07 DIAGNOSIS — M51.360 DEGENERATION OF INTERVERTEBRAL DISC OF LUMBAR REGION WITH DISCOGENIC BACK PAIN: ICD-10-CM

## 2025-01-07 PROCEDURE — 99213 OFFICE O/P EST LOW 20 MIN: CPT | Performed by: STUDENT IN AN ORGANIZED HEALTH CARE EDUCATION/TRAINING PROGRAM

## 2025-01-07 NOTE — PROGRESS NOTES
oriented to time, place and person.   Mood & Affect:The patient's mood and affect are appropriate.  Vascular: Examination reveals no swelling tenderness in upper or lower extremities. Good capillary refill.  Lymphatic: The lymphatic examination bilaterally reveals all areas to be without enlargement or induration  Sensation: Sensation is intact without deficit  Coordination/Balance: Good coordination     LUMBAR/SACRAL EXAMINATION:  Inspection: Local inspection shows no step-off or bruising.  Lumbar alignment is normal.  Sagittal and Coronal balance is neutral.      Palpation:   No evidence of tenderness at the midline.  No tenderness bilaterally at the paraspinal or trochanters.  There is no step-off or paraspinal spasm.   Range of Motion: painful with facet loading with extension and rotation to the right   Hip ER and IR are pain free and within normal limits.  Strength:   Strength testing is 5/5 in all muscle groups tested.   Special Tests:   Straight leg raise and crossed SLR negative. Slump test negative, Leg length and pelvis level.   Skin: There are no rashes, ulcerations or lesions.  Reflexes: Reflexes are symmetrically 2+ at the patellar and ankle tendons.  Clonus absent bilaterally at the feet.  Gait & station: normal, patient ambulates without assistance  Additional Examinations:   RIGHT LOWER EXTREMITY: Inspection/examination of the right lower extremity does not show any tenderness, deformity or injury. Range of motion is unremarkable. There is no gross instability.  There are no rashes, ulcerations or lesions. Strength and tone are normal.  LEFT LOWER EXTREMITY:  Inspection/examination of the left lower extremity does not show any tenderness, deformity or injury. Range of motion is unremarkable. There is no gross instability. There are no rashes, ulcerations or lesions.  Strength and tone are normal.    Diagnostic Testing:    AP and lateral lumbar spine films taken today in the office: No evidence of

## 2025-01-08 ENCOUNTER — HOSPITAL ENCOUNTER (OUTPATIENT)
Dept: ULTRASOUND IMAGING | Age: 60
Discharge: HOME | End: 2025-01-08
Payer: COMMERCIAL

## 2025-01-08 DIAGNOSIS — K76.89 LIVER NODULE: ICD-10-CM

## 2025-01-08 PROCEDURE — 76705 ECHO EXAM OF ABDOMEN: CPT

## 2025-01-15 ENCOUNTER — HOSPITAL ENCOUNTER (OUTPATIENT)
Dept: PHYSICAL THERAPY | Age: 60
Setting detail: THERAPIES SERIES
Discharge: HOME | End: 2025-01-15
Payer: COMMERCIAL

## 2025-01-15 DIAGNOSIS — M47.816 LUMBAR SPONDYLOSIS: Primary | ICD-10-CM

## 2025-01-15 DIAGNOSIS — M51.360 DISCOGENIC SYNDROME, LUMBAR: ICD-10-CM

## 2025-01-15 DIAGNOSIS — M54.50 LOW BACK PAIN WITHOUT SCIATICA, UNSPECIFIED BACK PAIN LATERALITY, UNSPECIFIED CHRONICITY: ICD-10-CM

## 2025-01-15 PROCEDURE — 97110 THERAPEUTIC EXERCISES: CPT

## 2025-01-15 PROCEDURE — 97161 PT EVAL LOW COMPLEX 20 MIN: CPT

## 2025-01-15 NOTE — PLAN OF CARE
[facility] - Outpatient Rehabilitation and Therapy [address], [place] [address] office: [phone] fax: [phone]     Physical Therapy Initial Evaluation Certification      Dear CARROLL Bautista ,    We had the pleasure of evaluating the following patient for physical therapy services at [facility].  A summary of our findings can be found in the initial assessment below.  This includes our plan of care.  If you have any questions or concerns regarding these findings, please do not hesitate to contact me at the office phone number listed above.  Thank you for the referral.     Physician Signature:_______________________________Date:__________________  By signing above (or electronic signature), therapist’s plan is approved by physician       Physical Therapy: TREATMENT/PROGRESS NOTE   Patient: Elena Orellana (59 y.o. female)   Examination Date: 01/15/2025   :  1965 MRN: [id number]   Visit #: 1   Insurance Allowable Auth Needed   20 [x]Yes    []No    Insurance: [place] / [place] / Product Type: *No Product type* /   Insurance ID: [id number] - ([facility])  Secondary Insurance (if applicable):    Treatment Diagnosis:     ICD-10-CM    1. Lumbar spondylosis  M47.816       2. Discogenic syndrome, lumbar  M51.360       3. Low back pain without sciatica, unspecified back pain laterality, unspecified chronicity  M54.50          Medical Diagnosis:  Spondylosis without myelopathy or radiculopathy, lumbar region [M47.816]  Degeneration of intervertebral disc of lumbar region with discogenic back pain [M51.360]   Referring Physician: Yessi Musa PA  PCP: Kin Leon MD     Plan of care signed (Y/N):     Date of Patient follow up with Physician:      Plan of Care Report: EVAL today  POC update due: (10 visits /OR AUTH LIMITS, whichever is less): 2025                                             Medical

## 2025-01-20 ENCOUNTER — HOSPITAL ENCOUNTER (OUTPATIENT)
Dept: PHYSICAL THERAPY | Age: 60
Setting detail: THERAPIES SERIES
Discharge: HOME | End: 2025-01-20
Payer: COMMERCIAL

## 2025-01-20 PROBLEM — Z00.00 ENCOUNTER FOR WELL ADULT EXAM WITHOUT ABNORMAL FINDINGS: Status: ACTIVE | Noted: 2025-01-20

## 2025-01-20 PROBLEM — R71.8 ELEVATED RED BLOOD CELL COUNT: Status: ACTIVE | Noted: 2025-01-20

## 2025-01-20 PROBLEM — M54.50 LOW BACK PAIN: Status: ACTIVE | Noted: 2025-01-20

## 2025-01-20 PROBLEM — R71.8 LOW MEAN CORPUSCULAR VOLUME (MCV): Status: ACTIVE | Noted: 2025-01-20

## 2025-01-20 PROBLEM — R20.0 NUMBNESS AND TINGLING OF BOTH LOWER EXTREMITIES: Status: ACTIVE | Noted: 2025-01-20

## 2025-01-20 PROBLEM — Z78.0 POSTMENOPAUSE: Status: ACTIVE | Noted: 2025-01-20

## 2025-01-20 PROBLEM — H61.23 BILATERAL IMPACTED CERUMEN: Status: ACTIVE | Noted: 2025-01-20

## 2025-01-20 PROBLEM — R20.2 NUMBNESS AND TINGLING OF BOTH LOWER EXTREMITIES: Status: ACTIVE | Noted: 2025-01-20

## 2025-01-20 PROCEDURE — 97110 THERAPEUTIC EXERCISES: CPT

## 2025-01-20 PROCEDURE — 97112 NEUROMUSCULAR REEDUCATION: CPT

## 2025-01-20 PROCEDURE — 97140 MANUAL THERAPY 1/> REGIONS: CPT

## 2025-01-20 ASSESSMENT — ENCOUNTER SYMPTOMS
EYE ITCHING: 0
FACIAL SWELLING: 0
NAUSEA: 0
BLOOD IN STOOL: 0
ANAL BLEEDING: 0
SINUS PAIN: 0
CONSTIPATION: 0
EYE PAIN: 0
SORE THROAT: 0
ABDOMINAL DISTENTION: 0
ABDOMINAL PAIN: 0
APNEA: 0
COUGH: 0
VOICE CHANGE: 0
SINUS PRESSURE: 0
SHORTNESS OF BREATH: 0
PHOTOPHOBIA: 0
DIARRHEA: 0
TROUBLE SWALLOWING: 0
EYE DISCHARGE: 0
CHOKING: 0
RECTAL PAIN: 0
WHEEZING: 0
CHEST TIGHTNESS: 0
RHINORRHEA: 0
VOMITING: 0
EYE REDNESS: 0

## 2025-01-20 NOTE — ASSESSMENT & PLAN NOTE
-not controlled  -Increase rosuvastatin 40 mg to every day and if not tolerated go back to every other day  -Try Mediterranean diet  -low cholesterol, low fat diet  -regular aerobic exercise

## 2025-01-20 NOTE — FLOWSHEET NOTE
[facility] - Outpatient Rehabilitation and Therapy [address], [place] [address] office: [phone] fax: [phone]     Physical Therapy: TREATMENT/PROGRESS NOTE   Patient: Elena Orellana (59 y.o. female)   Examination Date: 2025   :  1965 MRN: [id number]   Visit #: 2   Insurance Allowable Auth Needed   20 [x]Yes    []No    Insurance: [place] / [place] / Product Type: *No Product type* /   Insurance ID: [id number] - ([place] [employer])  Secondary Insurance (if applicable):    Treatment Diagnosis:     ICD-10-CM    1. Lumbar spondylosis  M47.816       2. Discogenic syndrome, lumbar  M51.360       3. Low back pain without sciatica, unspecified back pain laterality, unspecified chronicity  M54.50          Medical Diagnosis:  Spondylosis without myelopathy or radiculopathy, lumbar region [M47.816]  Degeneration of intervertebral disc of lumbar region with discogenic back pain [M51.360]   Referring Physician: Yessi Musa PA  PCP: Kin Leon MD     Plan of care signed (Y/N): Y    Date of Patient follow up with Physician:      Plan of Care Report: NO  POC update due: (10 visits /OR AUTH LIMITS, whichever is less): 2025                                             Medical History:  Comorbidities:  Osteoarthritis  Other: hyperlipidemia  Relevant Medical History:                                          Precautions/ Contra-indications:           Latex allergy:  NO  Pacemaker:    NO  Contraindications for Manipulation: None  Date of Surgery:   Other:    Red Flags:  None    Suicide Screening:   The patient did not verbalize a primary behavioral concern, suicidal ideation, suicidal intent, or demonstrate suicidal behaviors.    Preferred Language for Healthcare:   [x] English       [] other:    SUBJECTIVE EXAMINATION     Subjective: She reports she didn't a lot of the HEP, but reports she feels a little better than last visit. N&T is very slightly

## 2025-01-20 NOTE — ASSESSMENT & PLAN NOTE
-Comprehensive metabolic panel, CBC with differential, TSH, and lipid panel  -Mammogram done on 12/12/2024  -Pap smear is scheduled for the end of January  -Colonoscopy done on 12/6/22 by Dr. Arnaud Odonnell

## 2025-01-20 NOTE — ASSESSMENT & PLAN NOTE
-lumbar spine xray  -Ibuprofen 600mg 3 times daily as needed  -low back exercises  -Referral to Orthopedics Spine

## 2025-01-22 ENCOUNTER — HOSPITAL ENCOUNTER (OUTPATIENT)
Dept: PHYSICAL THERAPY | Age: 60
Setting detail: THERAPIES SERIES
Discharge: HOME | End: 2025-01-22
Payer: COMMERCIAL

## 2025-01-22 PROCEDURE — 97110 THERAPEUTIC EXERCISES: CPT

## 2025-01-22 PROCEDURE — 97112 NEUROMUSCULAR REEDUCATION: CPT

## 2025-01-22 PROCEDURE — 97140 MANUAL THERAPY 1/> REGIONS: CPT

## 2025-01-22 NOTE — FLOWSHEET NOTE
[facility] - Outpatient Rehabilitation and Therapy [address], [place] [address] office: [phone] fax: [phone]     Physical Therapy: TREATMENT/PROGRESS NOTE   Patient: Elena Orellana (59 y.o. female)   Examination Date: 2025   :  1965 MRN: [id number]   Visit #: 3   Insurance Allowable Auth Needed   20 [x]Yes    []No    Insurance: [place] / [place] / Product Type: *No Product type* /   Insurance ID: [id number] - ([place] [employer])  Secondary Insurance (if applicable):    Treatment Diagnosis:     ICD-10-CM    1. Lumbar spondylosis  M47.816       2. Discogenic syndrome, lumbar  M51.360       3. Low back pain without sciatica, unspecified back pain laterality, unspecified chronicity  M54.50          Medical Diagnosis:  Spondylosis without myelopathy or radiculopathy, lumbar region [M47.816]  Degeneration of intervertebral disc of lumbar region with discogenic back pain [M51.360]   Referring Physician: Yessi Musa PA  PCP: Kin Leon MD     Plan of care signed (Y/N): Y    Date of Patient follow up with Physician:      Plan of Care Report: NO  POC update due: (10 visits /OR AUTH LIMITS, whichever is less): 2025                                             Medical History:  Comorbidities:  Osteoarthritis  Other: hyperlipidemia  Relevant Medical History:                                          Precautions/ Contra-indications:           Latex allergy:  NO  Pacemaker:    NO  Contraindications for Manipulation: None  Date of Surgery:   Other:    Red Flags:  None    Suicide Screening:   The patient did not verbalize a primary behavioral concern, suicidal ideation, suicidal intent, or demonstrate suicidal behaviors.    Preferred Language for Healthcare:   [x] English       [] other:    SUBJECTIVE EXAMINATION     Subjective: Toe is tingling, but pain is significantly less.    Patient stated complaint: 6 months ago she started to have LBP.

## 2025-02-03 ENCOUNTER — APPOINTMENT (OUTPATIENT)
Dept: PHYSICAL THERAPY | Age: 60
End: 2025-02-03
Payer: COMMERCIAL

## 2025-02-05 ENCOUNTER — HOSPITAL ENCOUNTER (OUTPATIENT)
Dept: PHYSICAL THERAPY | Age: 60
Setting detail: THERAPIES SERIES
Discharge: HOME | End: 2025-02-05
Payer: COMMERCIAL

## 2025-02-05 PROCEDURE — 97110 THERAPEUTIC EXERCISES: CPT

## 2025-02-05 PROCEDURE — 97140 MANUAL THERAPY 1/> REGIONS: CPT

## 2025-02-05 NOTE — FLOWSHEET NOTE
[facility] - Outpatient Rehabilitation and Therapy [address], [place] [address] office: [phone] fax: [phone]     Physical Therapy: TREATMENT/PROGRESS NOTE   Patient: Elena Orellana (59 y.o. female)   Examination Date: 2025   :  1965 MRN: [id number]   Visit #: 4   Insurance Allowable Auth Needed   20 [x]Yes    []No    Insurance: [place] / [place] / Product Type: *No Product type* /   Insurance ID: [id number] - ([place] [employer])  Secondary Insurance (if applicable):    Treatment Diagnosis:     ICD-10-CM    1. Lumbar spondylosis  M47.816       2. Discogenic syndrome, lumbar  M51.360       3. Low back pain without sciatica, unspecified back pain laterality, unspecified chronicity  M54.50          Medical Diagnosis:  Spondylosis without myelopathy or radiculopathy, lumbar region [M47.816]  Degeneration of intervertebral disc of lumbar region with discogenic back pain [M51.360]   Referring Physician: Yessi Musa PA  PCP: Kin Leon MD     Plan of care signed (Y/N): Y    Date of Patient follow up with Physician:      Plan of Care Report: NO  POC update due: (10 visits /OR AUTH LIMITS, whichever is less): 2025                                             Medical History:  Comorbidities:  Osteoarthritis  Other: hyperlipidemia  Relevant Medical History:                                          Precautions/ Contra-indications:           Latex allergy:  NO  Pacemaker:    NO  Contraindications for Manipulation: None  Date of Surgery:   Other:    Red Flags:  None    Suicide Screening:   The patient did not verbalize a primary behavioral concern, suicidal ideation, suicidal intent, or demonstrate suicidal behaviors.    Preferred Language for Healthcare:   [x] English       [] other:    SUBJECTIVE EXAMINATION     Subjective: She has been on vacation and hasn't been doing any of her exercises. She states she finds PT exercises were more

## 2025-02-10 ENCOUNTER — HOSPITAL ENCOUNTER (OUTPATIENT)
Dept: PHYSICAL THERAPY | Age: 60
Setting detail: THERAPIES SERIES
Discharge: HOME | End: 2025-02-10
Payer: COMMERCIAL

## 2025-02-10 PROCEDURE — 97110 THERAPEUTIC EXERCISES: CPT

## 2025-02-10 PROCEDURE — 20560 NDL INSJ W/O NJX 1 OR 2 MUSC: CPT

## 2025-02-10 NOTE — FLOWSHEET NOTE
were adjusted after 3 minutes of stim.   She was challenged by posterior chain exercises and hip abductor work, but performed with proper form and control. Ongoing PT recommended to continue reduction of symptoms, then follow up with loading/strengthening of posterior chain and core musculature.     Medical Necessity Documentation:  I certify that this patient meets the below criteria necessary for medical necessity for care and/or justification of therapy services:  The patient has functional impairments and/or activity limitations and would benefit from continued outpatient therapy services to address the deficits outlined in the patients goals    Return to Play: NA    Prognosis for POC: [x] Good [] Fair  [] Poor    Patient requires continued skilled intervention: [x] Yes  [] No      CHARGE CAPTURE     PT CHARGE GRID   CPT Code (TIMED) minutes # CPT Code (UNTIMED) #     Therex (55092)  30 2  EVAL:LOW (50247 - Typically 20 minutes face-to-face)     Neuromusc. Re-ed (67755)    Re-Eval (07901)     Manual (71056)    Estim Unattended (27638)     Ther. Act (53363)    Mech. Traction (25306)     Gait (20178)    Dry Needle 1-2 muscle (17794) 1    Aquatic Therex (68810)    Dry Needle 3+ muscle (43916)     Iontophoresis (54493)    VASO (19653)     Ultrasound (81333)    Group Therapy (77648)     Estim Attended (82930) 6 0  Canalith Repositioning (14228)     Physical Performance Test (00418)    Custom orthotic ()     Other:    Other:    Total Timed Code Tx Minutes 36 2  1     Total Treatment Minutes 50      Charge Justification:  (31993) THERAPEUTIC EXERCISE - Provided verbal/tactile cueing for activities related to strengthening, flexibility, endurance, ROM performed to prevent loss of range of motion, maintain or improve muscular strength or increase flexibility, following either an injury or surgery.     GOALS     GOALS:  Patient stated goal: to get rid of low back pain, no more numbness or tingling  [x] Progressing:

## 2025-02-12 ENCOUNTER — APPOINTMENT (OUTPATIENT)
Dept: PHYSICAL THERAPY | Age: 60
End: 2025-02-12
Payer: COMMERCIAL

## 2025-02-19 PROBLEM — Z00.00 ENCOUNTER FOR WELL ADULT EXAM WITHOUT ABNORMAL FINDINGS: Status: RESOLVED | Noted: 2025-01-20 | Resolved: 2025-02-19

## 2025-02-27 ENCOUNTER — HOSPITAL ENCOUNTER (OUTPATIENT)
Dept: PHYSICAL THERAPY | Age: 60
Setting detail: THERAPIES SERIES
Discharge: HOME | End: 2025-02-27
Payer: COMMERCIAL

## 2025-02-27 PROCEDURE — 97110 THERAPEUTIC EXERCISES: CPT

## 2025-02-27 PROCEDURE — 97140 MANUAL THERAPY 1/> REGIONS: CPT

## 2025-02-27 PROCEDURE — 97530 THERAPEUTIC ACTIVITIES: CPT

## 2025-02-27 NOTE — PLAN OF CARE
[facility] - Outpatient Rehabilitation and Therapy [address], [place] [address] office: [phone] fax: [phone]  Physical Therapy Re-Certification Plan of Care    Dear CARROLL Bautista  ,    We had the pleasure of treating the following patient for physical therapy services at [facility]. A summary of our findings can be found in the updated assessment below.  This includes our plan of care.  If you have any questions or concerns regarding these findings, please do not hesitate to contact me at the office phone number checked above.  Thank you for the referral.     Physician Signature:________________________________Date:__________________  By signing above (or electronic signature), therapist's plan is approved by physician    Total Visits: 6     Full lumbar flexion/extension/SB    Supine to sit (negative); prone knee flexion (positive R - long to short)    Peroneal Nerve Bias SLR + R, SLR (negative)    LE MMT:   Right:  Left:  Hip aBduction:  4  4  Hip Extension:  4-  4-  Ankle DF:   5  5  Ankle PF:   4+  NT  Ankle Eversion:  4+  5    Overall Response to Treatment:  Patient is responding well to treatment and improvement is noted with regards to goals. At this time, the patient's functional activity tolerance is improving steadily. She continues to exper experience tingling in the right lateral calf posterior ankle and great toe. Asymmetries of lumbar pelvic alignment were addressed with manual manipulation of the sacroiliac joint and with left leg long axis distraction. I suspect she may benefit from a steroid dose pack to reduce inflammation and abolish tingling in the right L5 distribution and have notified her primary care physician about this as her referring physician is currently off on maternity leave. Today's session incorporated update of home exercise program due to the patient's upcoming travel and she will follow up with PT in mid

## 2025-04-16 ENCOUNTER — OFFICE VISIT (OUTPATIENT)
Dept: CARDIOLOGY CLINIC | Age: 60
End: 2025-04-16
Payer: COMMERCIAL

## 2025-04-16 VITALS
OXYGEN SATURATION: 98 % | WEIGHT: 133 LBS | DIASTOLIC BLOOD PRESSURE: 72 MMHG | HEIGHT: 63 IN | HEART RATE: 75 BPM | SYSTOLIC BLOOD PRESSURE: 128 MMHG | BODY MASS INDEX: 23.57 KG/M2

## 2025-04-16 DIAGNOSIS — K76.89 LIVER NODULE: ICD-10-CM

## 2025-04-16 DIAGNOSIS — E78.2 MIXED HYPERLIPIDEMIA: ICD-10-CM

## 2025-04-16 DIAGNOSIS — R00.2 PALPITATIONS: Primary | ICD-10-CM

## 2025-04-16 PROCEDURE — 99214 OFFICE O/P EST MOD 30 MIN: CPT | Performed by: INTERNAL MEDICINE

## 2025-04-16 RX ORDER — RUTIN/HESP/BIOFLAV/C/HERBAL196 40-25-50MG
TABLET ORAL
COMMUNITY

## 2025-04-16 NOTE — PROGRESS NOTES
[facility]  Cardiac Consult     Referring Provider:  Kin Leon MD     Chief Complaint   Patient presents with    1 Year Follow Up    Hyperlipidemia        History of Present Illness:  59 y.o. female seen in f/u for palpitations and hyperlipidemia.    She has a family h/o hyperlipidemia in her mother and father. Her father  of of ruptured aortic aneurysm. She has a h/o hyperlipidemia as well with LDL running in the 220 range. She had a stress myoview that was normal. She also had a coronary calcium score that was normal. She decided to treat her lipids with crestor.    She is doing well.  She does not like to take medications however.  She had her lipids redone in January and her LDL had increased.  She says she was not taking Crestor on a regular basis.  She was taking about a half a pill every other day.  When she saw her results and noticed that her cholesterol had increased she began to take a higher dose.  She is currently taking 20 mg 1 day and 40 mg the next.  She remains very active without chest pain or shortness of breath.      Past Medical History:   has a past medical history of Allergic rhinitis, Fibrocystic breast, Hyperlipidemia, Osteoarthritis of knee, Osteoarthritis of right knee, Pneumonia, and Tenosynovitis of foot.    Surgical History:   has a past surgical history that includes hernia repair and Breast lumpectomy ().     Social History:  Social History     Tobacco Use    Smoking status: Former     Current packs/day: 0.00     Average packs/day: 0.3 packs/day for 6.0 years (1.5 ttl pk-yrs)     Types: Cigarettes     Start date: 1985     Quit date: 1991     Years since quittin.9     Passive exposure: Past    Smokeless tobacco: Never   Substance Use Topics    Alcohol use: No     Comment: Alcoholic        Family History:  family history includes Alcohol Abuse in her brother and father; Arthritis in her mother; Asthma in her brother and mother; Brain Cancer in

## 2025-05-14 ENCOUNTER — TELEPHONE (OUTPATIENT)
Dept: CARDIOLOGY CLINIC | Age: 60
End: 2025-05-14

## 2025-05-14 DIAGNOSIS — E78.2 MIXED HYPERLIPIDEMIA: ICD-10-CM

## 2025-05-15 RX ORDER — ROSUVASTATIN CALCIUM 40 MG/1
40 TABLET, COATED ORAL DAILY
Qty: 90 TABLET | Refills: 1 | Status: SHIPPED | OUTPATIENT
Start: 2025-05-15

## 2025-05-15 NOTE — TELEPHONE ENCOUNTER
Requested Prescriptions     Pending Prescriptions Disp Refills    rosuvastatin (CRESTOR) 40 MG tablet [Pharmacy Med Name: ROSUVASTATIN 40MG TABLETS] 90 tablet 4     Sig: TAKE 1 TABLET BY MOUTH DAILY      LAST OV: 4/16/2025   NEXT OV: Recall List  LAST FILLED: 02/26/2024

## 2025-05-15 NOTE — TELEPHONE ENCOUNTER
Pt was going to have lipids rechecked for [facility].Order is in [employer] system. Refilled Crestor but please call pt and see if they can get labs done.

## 2025-05-22 ENCOUNTER — HOSPITAL ENCOUNTER (OUTPATIENT)
Age: 60
Discharge: HOME | End: 2025-05-22

## 2025-05-22 DIAGNOSIS — E78.2 MIXED HYPERLIPIDEMIA: ICD-10-CM

## 2025-05-22 LAB
CHOLEST SERPL-MCNC: 168 MG/DL (ref 0–199)
HDLC SERPL-MCNC: 67 MG/DL (ref 40–60)
LDL CHOLESTEROL: 93 MG/DL
TRIGL SERPL-MCNC: 39 MG/DL (ref 0–150)
VLDLC SERPL CALC-MCNC: 8 MG/DL

## 2025-05-27 ENCOUNTER — RESULTS FOLLOW-UP (OUTPATIENT)
Dept: CARDIOLOGY CLINIC | Age: 60
End: 2025-05-27

## 2025-06-17 DIAGNOSIS — R73.03 PREDIABETES: Primary | ICD-10-CM

## 2025-06-18 ENCOUNTER — OFFICE VISIT (OUTPATIENT)
Age: 60
End: 2025-06-18
Payer: COMMERCIAL

## 2025-06-18 VITALS — HEIGHT: 62 IN | BODY MASS INDEX: 24.29 KG/M2 | WEIGHT: 132 LBS

## 2025-06-18 DIAGNOSIS — M51.360 DEGENERATION OF INTERVERTEBRAL DISC OF LUMBAR REGION WITH DISCOGENIC BACK PAIN: ICD-10-CM

## 2025-06-18 DIAGNOSIS — M47.816 SPONDYLOSIS WITHOUT MYELOPATHY OR RADICULOPATHY, LUMBAR REGION: Primary | ICD-10-CM

## 2025-06-18 PROCEDURE — 99213 OFFICE O/P EST LOW 20 MIN: CPT | Performed by: STUDENT IN AN ORGANIZED HEALTH CARE EDUCATION/TRAINING PROGRAM

## 2025-06-18 NOTE — PROGRESS NOTES
swelling or redness  PSYCHOLOGICAL: Denies anxiety, depression   SKIN: Denies skin changes, delayed healing, rash, itching   HEMATOLOGIC: Denies easy bleeding or bruising  ENDOCRINE: Denies excessive thirst, urination, heat/cold  RESPIRATORY: Denies current dyspnea, cough  GI: Denies nausea, vomiting, diarrhea   : Denies bowel or bladder issues      PHYSICAL EXAM:    Vitals: Height 1.575 m (5' 2\"), weight 59.9 kg (132 lb), not currently breastfeeding.    GENERAL EXAM:  General Apparence: Patient is adequately groomed with no evidence of malnutrition.  Orientation: The patient is oriented to time, place and person.   Mood & Affect:The patient's mood and affect are appropriate.  Vascular: Examination reveals no swelling tenderness in upper or lower extremities. Good capillary refill.  Lymphatic: The lymphatic examination bilaterally reveals all areas to be without enlargement or induration  Sensation: Sensation is intact without deficit  Coordination/Balance: Good coordination     LUMBAR/SACRAL EXAMINATION:  Inspection: Local inspection shows no step-off or bruising.  Lumbar alignment is normal.  Sagittal and Coronal balance is neutral.      Palpation:   No evidence of tenderness at the midline.  No tenderness bilaterally at the paraspinal or trochanters.  There is no step-off or paraspinal spasm.   Range of Motion: painful with facet loading with extension and rotation to the right   Hip ER and IR are pain free and within normal limits.  Strength:   Strength testing is 5/5 in all muscle groups tested.   Special Tests:   Straight leg raise and crossed SLR negative. Slump test negative, Leg length and pelvis level.   Skin: There are no rashes, ulcerations or lesions.  Reflexes: Reflexes are symmetrically 2+ at the patellar and ankle tendons.  Clonus absent bilaterally at the feet.  Gait & station: normal, patient ambulates without assistance  Additional Examinations:   RIGHT LOWER EXTREMITY: Inspection/examination of

## 2025-07-09 ENCOUNTER — HOSPITAL ENCOUNTER (OUTPATIENT)
Dept: PHYSICAL THERAPY | Age: 60
Setting detail: THERAPIES SERIES
End: 2025-07-09
Payer: COMMERCIAL

## 2025-07-09 DIAGNOSIS — M51.360 DISCOGENIC SYNDROME, LUMBAR: ICD-10-CM

## 2025-07-09 DIAGNOSIS — M47.816 LUMBAR SPONDYLOSIS: Primary | ICD-10-CM

## 2025-07-15 ENCOUNTER — HOSPITAL ENCOUNTER (OUTPATIENT)
Dept: PHYSICAL THERAPY | Age: 60
Setting detail: THERAPIES SERIES
Discharge: HOME | End: 2025-07-15
Payer: COMMERCIAL

## 2025-07-15 DIAGNOSIS — M51.360 DISCOGENIC SYNDROME, LUMBAR: ICD-10-CM

## 2025-07-15 DIAGNOSIS — M47.816 LUMBAR SPONDYLOSIS: Primary | ICD-10-CM

## 2025-07-15 PROCEDURE — 97140 MANUAL THERAPY 1/> REGIONS: CPT

## 2025-07-15 PROCEDURE — 97161 PT EVAL LOW COMPLEX 20 MIN: CPT

## 2025-07-15 PROCEDURE — 97110 THERAPEUTIC EXERCISES: CPT

## 2025-07-15 NOTE — PLAN OF CARE
[facility] - Outpatient Rehabilitation and Therapy: [address], [place] [address] office: [phone] fax: [phone]     Physical Therapy Initial Evaluation Certification      Dear CARROLL Bautista ,    We had the pleasure of evaluating the following patient for physical therapy services at [facility].  A summary of our findings can be found in the initial assessment below.  This includes our plan of care.  If you have any questions or concerns regarding these findings, please do not hesitate to contact me at the office phone number listed above.  Thank you for the referral.     Physician Signature:_______________________________Date:__________________  By signing above (or electronic signature), therapist’s plan is approved by physician       Physical Therapy: TREATMENT/PROGRESS NOTE   Patient: Elena Orellana (60 y.o. female)   Examination Date: 07/15/2025   :  1965 MRN: [id number]   Visit #: 1   Insurance Allowable Auth Needed    [x]Yes    []No    Insurance: [place] / [place] / Product Type: *No Product type* /   Insurance ID: [id number] - ([facility])  Secondary Insurance (if applicable):    Treatment Diagnosis:     ICD-10-CM    1. Lumbar spondylosis  M47.816       2. Discogenic syndrome, lumbar  M51.360          Medical Diagnosis:  Spondylosis without myelopathy or radiculopathy, lumbar region [M47.816]  Degeneration of intervertebral disc of lumbar region with discogenic back pain [M51.360]   Referring Physician: Yessi Musa PA  PCP: Kin Leon MD     Plan of care signed (Y/N):     Date of Patient follow up with Physician:      Plan of Care Report: EVAL today  POC update due: (10 visits /OR AUTH LIMITS, whichever is less)  2025                                             Medical History:  Comorbidities:  Osteoarthritis  Relevant Medical History:

## 2025-07-21 ENCOUNTER — APPOINTMENT (OUTPATIENT)
Dept: PHYSICAL THERAPY | Age: 60
End: 2025-07-21
Payer: COMMERCIAL

## 2025-07-21 ENCOUNTER — HOSPITAL ENCOUNTER (OUTPATIENT)
Dept: PHYSICAL THERAPY | Age: 60
Setting detail: THERAPIES SERIES
End: 2025-07-21
Payer: COMMERCIAL

## 2025-07-25 ENCOUNTER — HOSPITAL ENCOUNTER (OUTPATIENT)
Dept: PHYSICAL THERAPY | Age: 60
Setting detail: THERAPIES SERIES
Discharge: HOME | End: 2025-07-25
Payer: COMMERCIAL

## 2025-07-25 PROCEDURE — 97110 THERAPEUTIC EXERCISES: CPT

## 2025-07-25 PROCEDURE — 97140 MANUAL THERAPY 1/> REGIONS: CPT

## 2025-07-25 PROCEDURE — 97112 NEUROMUSCULAR REEDUCATION: CPT

## 2025-07-25 NOTE — FLOWSHEET NOTE
[facility] - Outpatient Rehabilitation and Therapy: [address], [place] [address] office: [phone] fax: [phone]    Physical Therapy: TREATMENT/PROGRESS NOTE   Patient: Elena Orellana (60 y.o. female)   Examination Date: 2025   :  1965 MRN: [id number]   Visit #: 2   Insurance Allowable Auth Needed    [x]Yes    []No    Insurance: [place] / [place] / Product Type: *No Product type* /   Insurance ID: [id number] - ([place] [employer])  Secondary Insurance (if applicable):    Treatment Diagnosis:     ICD-10-CM    1. Lumbar spondylosis  M47.816       2. Discogenic syndrome, lumbar  M51.360          Medical Diagnosis:  Spondylosis without myelopathy or radiculopathy, lumbar region [M47.816]  Degeneration of intervertebral disc of lumbar region with discogenic back pain [M51.360]   Referring Physician: Yessi Musa PA  PCP: Kin Leon MD Y  Plan of care signed (Y/N):     Date of Patient follow up with Physician:      Plan of Care Report: NO  POC update due: (10 visits /OR AUTH LIMITS, whichever is less)  2025                                             Medical History:  Comorbidities:  Osteoarthritis  Relevant Medical History:                                          Precautions/ Contra-indications:           Latex allergy:  NO  Pacemaker:    NO  Contraindications for Manipulation: None  Date of Surgery:   Other:    Red Flags:  None    Suicide Screening:   The patient did not verbalize a primary behavioral concern, suicidal ideation, suicidal intent, or demonstrate suicidal behaviors.    Preferred Language for Healthcare:   [x] English       [] other:    SUBJECTIVE EXAMINATION     Subjective: Off of steroid dose pack so symptoms were worse yesterday (-6/10) but today is a little better. Notes some pelvic popping with LE movements.    Patient stated complaint: Symptoms were under control through 2025 (but PT cut short by

## 2025-07-30 ENCOUNTER — HOSPITAL ENCOUNTER (OUTPATIENT)
Age: 60
Discharge: HOME | End: 2025-07-30
Payer: COMMERCIAL

## 2025-07-30 ENCOUNTER — HOSPITAL ENCOUNTER (OUTPATIENT)
Dept: PHYSICAL THERAPY | Age: 60
Setting detail: THERAPIES SERIES
Discharge: HOME | End: 2025-07-30
Payer: COMMERCIAL

## 2025-07-30 DIAGNOSIS — R73.03 PREDIABETES: ICD-10-CM

## 2025-07-30 PROCEDURE — 97140 MANUAL THERAPY 1/> REGIONS: CPT

## 2025-07-30 PROCEDURE — 20560 NDL INSJ W/O NJX 1 OR 2 MUSC: CPT

## 2025-07-30 PROCEDURE — 83036 HEMOGLOBIN GLYCOSYLATED A1C: CPT

## 2025-07-30 PROCEDURE — 97110 THERAPEUTIC EXERCISES: CPT

## 2025-07-30 PROCEDURE — 36415 COLL VENOUS BLD VENIPUNCTURE: CPT

## 2025-07-30 PROCEDURE — 97112 NEUROMUSCULAR REEDUCATION: CPT

## 2025-07-30 NOTE — FLOWSHEET NOTE
Neuromusc. Re-ed (41399) 22 1  Re-Eval (02276)     Manual (44460) 10 1  Estim Unattended (38906)     Ther. Act (00206)    Mech. Traction (03898)     Gait (39033)    Dry Needle 1-2 muscle (46700) 1    Aquatic Therex (31584)    Dry Needle 3+ muscle (20561)     Iontophoresis (07908)    VASO (28170)     Ultrasound (71053)    Group Therapy (62007)     Estim Attended (96826)    Canalith Repositioning (92640)     Physical Performance Test (69483)    Custom orthotic ()     Other:    Other:    Total Timed Code Tx Minutes 38 3  1     Total Treatment Minutes 45      Charge Justification:  (09019) THERAPEUTIC EXERCISE - Provided verbal/tactile cueing for HEP and/or activities related to strengthening, flexibility, endurance, ROM performed to prevent loss of range of motion, maintain or improve muscular strength or increase flexibility, following either an injury or surgery.   (65576) NEUROMUSCULAR RE-EDUCATION - Provided therapeutic procedure on activities related to neuromuscular reeducation of movement, balance, coordination, kinesthetic sense, posture, and/or proprioception for sitting and/or standing activities. Provided HEP review and/or progression.  (14462) MANUAL THERAPY -  Manual therapy techniques, 1 or more regions, each 15 minutes (Mobilization/manipulation, manual lymphatic drainage, manual traction) for the purpose of modulating pain, promoting relaxation,  increasing ROM, reducing/eliminating soft tissue swelling/inflammation/restriction, improving soft tissue extensibility and allowing for proper ROM for normal function with self care, mobility, lifting and ambulation    GOALS     GOALS:  Patient stated goal: strength building, eliminate pain/numbness & tingling  [] Progressing: [] Met: [] Not Met: [] Adjusted    Therapist goals for Patient:   Short Term Goals: To be achieved in: 2 weeks  Independent and compliant (minimum of 3 days per week) with HEP and progressions to progress PT plan of care and promote

## 2025-07-31 ENCOUNTER — OFFICE VISIT (OUTPATIENT)
Dept: PRIMARY CARE CLINIC | Age: 60
End: 2025-07-31
Payer: COMMERCIAL

## 2025-07-31 VITALS
OXYGEN SATURATION: 98 % | HEIGHT: 63 IN | TEMPERATURE: 97.2 F | WEIGHT: 135 LBS | BODY MASS INDEX: 23.92 KG/M2 | HEART RATE: 66 BPM | RESPIRATION RATE: 16 BRPM | DIASTOLIC BLOOD PRESSURE: 84 MMHG | SYSTOLIC BLOOD PRESSURE: 120 MMHG

## 2025-07-31 DIAGNOSIS — Z00.00 ENCOUNTER FOR WELL ADULT EXAM WITHOUT ABNORMAL FINDINGS: Primary | ICD-10-CM

## 2025-07-31 DIAGNOSIS — E78.2 MIXED HYPERLIPIDEMIA: Primary | ICD-10-CM

## 2025-07-31 DIAGNOSIS — R73.03 PREDIABETES: ICD-10-CM

## 2025-07-31 LAB
EST. AVERAGE GLUCOSE BLD GHB EST-MCNC: 119.8 MG/DL
HBA1C MFR BLD: 5.8 %

## 2025-07-31 PROCEDURE — 99213 OFFICE O/P EST LOW 20 MIN: CPT | Performed by: INTERNAL MEDICINE

## 2025-07-31 SDOH — ECONOMIC STABILITY: FOOD INSECURITY: WITHIN THE PAST 12 MONTHS, YOU WORRIED THAT YOUR FOOD WOULD RUN OUT BEFORE YOU GOT MONEY TO BUY MORE.: NEVER TRUE

## 2025-07-31 SDOH — ECONOMIC STABILITY: FOOD INSECURITY: WITHIN THE PAST 12 MONTHS, THE FOOD YOU BOUGHT JUST DIDN'T LAST AND YOU DIDN'T HAVE MONEY TO GET MORE.: NEVER TRUE

## 2025-07-31 ASSESSMENT — PATIENT HEALTH QUESTIONNAIRE - PHQ9
1. LITTLE INTEREST OR PLEASURE IN DOING THINGS: NOT AT ALL
2. FEELING DOWN, DEPRESSED OR HOPELESS: NOT AT ALL
SUM OF ALL RESPONSES TO PHQ QUESTIONS 1-9: 0

## 2025-08-04 ENCOUNTER — HOSPITAL ENCOUNTER (OUTPATIENT)
Dept: PHYSICAL THERAPY | Age: 60
Setting detail: THERAPIES SERIES
Discharge: HOME | End: 2025-08-04
Payer: COMMERCIAL

## 2025-08-04 PROCEDURE — 97112 NEUROMUSCULAR REEDUCATION: CPT

## 2025-08-04 PROCEDURE — 97110 THERAPEUTIC EXERCISES: CPT

## 2025-08-04 PROCEDURE — 97140 MANUAL THERAPY 1/> REGIONS: CPT

## 2025-08-06 ENCOUNTER — HOSPITAL ENCOUNTER (OUTPATIENT)
Dept: PHYSICAL THERAPY | Age: 60
Setting detail: THERAPIES SERIES
Discharge: HOME | End: 2025-08-06
Payer: COMMERCIAL

## 2025-08-06 PROCEDURE — 97110 THERAPEUTIC EXERCISES: CPT

## 2025-08-06 PROCEDURE — 20560 NDL INSJ W/O NJX 1 OR 2 MUSC: CPT

## 2025-08-06 PROCEDURE — 97530 THERAPEUTIC ACTIVITIES: CPT

## 2025-08-08 ASSESSMENT — ENCOUNTER SYMPTOMS
NAUSEA: 0
ABDOMINAL PAIN: 0
SHORTNESS OF BREATH: 0
VOMITING: 0

## 2025-08-11 ENCOUNTER — HOSPITAL ENCOUNTER (OUTPATIENT)
Dept: PHYSICAL THERAPY | Age: 60
Setting detail: THERAPIES SERIES
End: 2025-08-11
Payer: COMMERCIAL

## 2025-08-18 ENCOUNTER — HOSPITAL ENCOUNTER (OUTPATIENT)
Dept: PHYSICAL THERAPY | Age: 60
Setting detail: THERAPIES SERIES
Discharge: HOME | End: 2025-08-18
Payer: COMMERCIAL

## 2025-08-18 PROCEDURE — 97110 THERAPEUTIC EXERCISES: CPT

## 2025-08-18 PROCEDURE — 97140 MANUAL THERAPY 1/> REGIONS: CPT

## 2025-08-18 PROCEDURE — 97112 NEUROMUSCULAR REEDUCATION: CPT

## 2025-08-22 ENCOUNTER — HOSPITAL ENCOUNTER (OUTPATIENT)
Dept: PHYSICAL THERAPY | Age: 60
Setting detail: THERAPIES SERIES
End: 2025-08-22
Payer: COMMERCIAL

## 2025-08-29 ENCOUNTER — HOSPITAL ENCOUNTER (OUTPATIENT)
Dept: PHYSICAL THERAPY | Age: 60
Setting detail: THERAPIES SERIES
Discharge: HOME | End: 2025-08-29
Payer: COMMERCIAL

## 2025-08-29 PROCEDURE — 97110 THERAPEUTIC EXERCISES: CPT

## 2025-08-29 PROCEDURE — 97530 THERAPEUTIC ACTIVITIES: CPT

## 2025-09-04 ENCOUNTER — HOSPITAL ENCOUNTER (OUTPATIENT)
Dept: PHYSICAL THERAPY | Age: 60
Setting detail: THERAPIES SERIES
Discharge: HOME | End: 2025-09-04
Payer: COMMERCIAL

## 2025-09-04 PROCEDURE — 97140 MANUAL THERAPY 1/> REGIONS: CPT

## 2025-09-04 PROCEDURE — 97530 THERAPEUTIC ACTIVITIES: CPT

## 2025-09-04 PROCEDURE — 97110 THERAPEUTIC EXERCISES: CPT
